# Patient Record
Sex: MALE | Employment: OTHER | URBAN - METROPOLITAN AREA
[De-identification: names, ages, dates, MRNs, and addresses within clinical notes are randomized per-mention and may not be internally consistent; named-entity substitution may affect disease eponyms.]

---

## 2017-01-04 ENCOUNTER — TELEPHONE (OUTPATIENT)
Dept: FAMILY MEDICINE | Facility: CLINIC | Age: 37
End: 2017-01-04

## 2017-01-04 NOTE — TELEPHONE ENCOUNTER
----- Message from Lory Ivy sent at 1/4/2017  1:53 PM CST -----  Rx Valsartan 40 mg is not covered but Rx Losartatin is covered.  Please call [facility] at [phone].

## 2017-01-12 ENCOUNTER — PATIENT MESSAGE (OUTPATIENT)
Dept: FAMILY MEDICINE | Facility: CLINIC | Age: 37
End: 2017-01-12

## 2017-01-12 NOTE — TELEPHONE ENCOUNTER
See jeff albertsg.   Called [facility] pharmacist and she reported Valsartan is not covered, alternative is Losartan or PA needed.     Do not have any information on PA, requested refax of PA request.

## 2017-01-17 NOTE — TELEPHONE ENCOUNTER
Requested again, to initiate PA for Valsartan, 40 mg been approved for 45 tab for 30 day supply for 12 months starting 1/6/17 to 1/8/2018.  Pa id# [id number].   Spoke with Mariaelena with [employer].  She stated the med has to be filled 30 day supply at a time to be approved.  [facility] as [facility] supply which is #45.  Pt informed script is approved and being filled at pharmacy.

## 2017-03-03 ENCOUNTER — DOCUMENTATION ONLY (OUTPATIENT)
Dept: ADMINISTRATIVE | Facility: HOSPITAL | Age: 37
End: 2017-03-03

## 2017-03-03 NOTE — PROGRESS NOTES
PRE-VISIT CHART REVIEW    Appointment Scheduled on 3/17/17    Department stratifications & guidelines reviewed:yes    Target Chronic Diagnosis: HTN, obesity    Chronic Diagnosis Intervention Due: no    Goals Updated:No    There are no preventive care reminders to display for this patient.    Advanced Directives:   65 years of age or older?  No  Directive on file?  N\A                                      Pre-visit patient communication:  In Person    Studies or screenings scheduled pre-visit: no    Educate patient on HTN (135/80), obesity (28.42)

## 2017-03-17 ENCOUNTER — OFFICE VISIT (OUTPATIENT)
Dept: FAMILY MEDICINE | Facility: CLINIC | Age: 37
End: 2017-03-17
Payer: MEDICAID

## 2017-03-17 VITALS
WEIGHT: 176.81 LBS | DIASTOLIC BLOOD PRESSURE: 78 MMHG | RESPIRATION RATE: 12 BRPM | HEART RATE: 88 BPM | SYSTOLIC BLOOD PRESSURE: 114 MMHG | BODY MASS INDEX: 27.75 KG/M2 | TEMPERATURE: 98 F | HEIGHT: 67 IN

## 2017-03-17 DIAGNOSIS — M79.646 PAIN OF FINGER, UNSPECIFIED LATERALITY: ICD-10-CM

## 2017-03-17 DIAGNOSIS — I10 HYPERTENSION, ESSENTIAL: Primary | ICD-10-CM

## 2017-03-17 DIAGNOSIS — M25.569 KNEE PAIN, UNSPECIFIED CHRONICITY, UNSPECIFIED LATERALITY: ICD-10-CM

## 2017-03-17 DIAGNOSIS — E79.0 HYPERURICEMIA: ICD-10-CM

## 2017-03-17 LAB
ALBUMIN SERPL BCP-MCNC: 4.3 G/DL
ALP SERPL-CCNC: 71 U/L
ALT SERPL W/O P-5'-P-CCNC: 34 U/L
ANION GAP SERPL CALC-SCNC: 8 MMOL/L
AST SERPL-CCNC: 27 U/L
BILIRUB SERPL-MCNC: 0.6 MG/DL
BUN SERPL-MCNC: 17 MG/DL
CALCIUM SERPL-MCNC: 9.6 MG/DL
CHLORIDE SERPL-SCNC: 106 MMOL/L
CO2 SERPL-SCNC: 28 MMOL/L
CREAT SERPL-MCNC: 1 MG/DL
EST. GFR  (AFRICAN AMERICAN): >60 ML/MIN/1.73 M^2
EST. GFR  (NON AFRICAN AMERICAN): >60 ML/MIN/1.73 M^2
GLUCOSE SERPL-MCNC: 91 MG/DL
POTASSIUM SERPL-SCNC: 4 MMOL/L
PROT SERPL-MCNC: 7.4 G/DL
SODIUM SERPL-SCNC: 142 MMOL/L
URATE SERPL-MCNC: 7.7 MG/DL

## 2017-03-17 PROCEDURE — 36415 COLL VENOUS BLD VENIPUNCTURE: CPT | Mod: S$GLB,,, | Performed by: FAMILY MEDICINE

## 2017-03-17 PROCEDURE — 99214 OFFICE O/P EST MOD 30 MIN: CPT | Mod: S$GLB,,, | Performed by: FAMILY MEDICINE

## 2017-03-17 PROCEDURE — 80053 COMPREHEN METABOLIC PANEL: CPT

## 2017-03-17 PROCEDURE — 84550 ASSAY OF BLOOD/URIC ACID: CPT

## 2017-03-17 NOTE — MR AVS SNAPSHOT
"    [facility]  [address]  [facility] [address]  Phone: [phone]  Fax: [phone]                  Cruz Hutchinson   3/17/2017 8:10 AM   Office Visit    Description:  Male : 1980   Provider:  Isaura Mcgarry MD   Department:  [facility]           Reason for Visit     Follow-up           Diagnoses this Visit        Comments    Hypertension, essential    -  Primary            To Do List           Goals (5 Years of Data)     None      [facility]     [facility] Care Line -  Assistance  Registered nurses in the [facility] Center provide clinical advisement, health education, appointment booking, and other advisory services.  Call for this free service at [phone].             Medications           Message regarding Medications     Verify the changes and/or additions to your medication regime listed below are the same as discussed with your clinician today.  If any of these changes or additions are incorrect, please notify your healthcare provider.             Verify that the below list of medications is an accurate representation of the medications you are currently taking.  If none reported, the list may be blank. If incorrect, please contact your healthcare provider. Carry this list with you in case of emergency.           Current Medications     ascorbic acid (VITAMIN C) 500 MG tablet Take 500 mg by mouth once daily.    cyanocobalamin (VITAMIN B-12) 100 MCG tablet Take 500 mcg by mouth once daily.    ODORLESS GARLIC ORAL Take 1,000 mg by mouth once daily.    valsartan (DIOVAN) 40 MG tablet Take 1 and 1/2 tablets by  mouth once daily           Clinical Reference Information           Your Vitals Were     BP Pulse Temp Resp Height Weight    114/78 88 98.1 °F (36.7 °C) (Oral) 12 5' 7" (1.702 m) 80.2 kg (176 lb 12.9 oz)    BMI                27.69 kg/m2          Blood Pressure          Most Recent Value    BP  114/78 "      Allergies as of 3/17/2017     No Known Allergies      Immunizations Administered on Date of Encounter - 3/17/2017     None      Orders Placed During Today's Visit      Normal Orders This Visit    Comprehensive metabolic panel     Uric acid       Language Assistance Services     ATTENTION: Language assistance services are available, free of charge. Please call [phone].      ATENCIÓN: Si paras fernando, tiene a cee disposición servicios gratuitos de asistencia lingüística. Llame al [phone].     CHÚ Ý: N?u b?n nói Ti?ng Vi?t, có các d?ch v? h? tr? ngôn ng? mi?n phí dành cho b?n. G?i s? [phone].         [facility] complies with applicable Federal civil rights laws and does not discriminate on the basis of race, color, national origin, age, disability, or sex.

## 2017-03-18 ENCOUNTER — PATIENT MESSAGE (OUTPATIENT)
Dept: FAMILY MEDICINE | Facility: CLINIC | Age: 37
End: 2017-03-18

## 2017-03-19 NOTE — PROGRESS NOTES
Subjective:       Patient ID: Cruz Hutchinson is a 36 y.o. male.    Chief Complaint: Follow-up (HTN)    HPI   The patient is a 36-year-old who is here today for follow-up.  Today we discussed the followin)  hypertension.  He is taking his Diovan regularly.  He has not felt as if his blood pressure has been high or low  2)  elevated LFTs.  He has significantly cut back on his alcohol intake.  He now drinks no more than one beer a week although he will drink more beer on the weekend.  He typically drinks 1 or 2 glasses of wine a night.  3)  right knee pain.  He has been having right knee pain for quite some time.  He especially notices his right knee pain when he is squatting.  He describes his knee as feeling as if he is having some stretching in the outer aspect of his right knee.  Sometimes the knee will pop or feel like it will give out.  He denies any specific injury or trauma to his knees although he has been hard on them over his life  4)  pain in the left middle finger.  Sometimes will have pain in his left middle finger if he extremely flexes his finger at the knuckle.  This is not a significant issue for him but he would like for me to be aware of it  5)  hyperuricemia.  We did discuss his last uric acid level.  He has not had any clear-cut episodes of gout himself but does worry about developing this given his strong family history of this condition.  He is not interested in a medication to lower his uric acid level      Review of Systems   Constitutional: Negative for appetite change, chills, diaphoresis, fatigue, fever and unexpected weight change.   HENT: Negative for congestion, ear pain, postnasal drip, rhinorrhea, sinus pressure, sneezing, sore throat and trouble swallowing.    Eyes: Negative for pain, discharge and visual disturbance.   Respiratory: Negative for cough, chest tightness, shortness of breath and wheezing.    Cardiovascular: Negative for chest pain, palpitations and leg swelling.    Gastrointestinal: Negative for abdominal distention, abdominal pain, blood in stool, constipation, diarrhea, nausea and vomiting.   Skin: Negative for rash.       Objective:      Physical Exam   Constitutional: He is oriented to person, place, and time. He appears well-developed and well-nourished. No distress.   HENT:   Head: Normocephalic and atraumatic.   Right Ear: Hearing, tympanic membrane, external ear and ear canal normal.   Left Ear: Hearing, tympanic membrane, external ear and ear canal normal.   Nose: Nose normal.   Mouth/Throat: Oropharynx is clear and moist and mucous membranes are normal. No oral lesions. No oropharyngeal exudate, posterior oropharyngeal edema or posterior oropharyngeal erythema.   Eyes: Conjunctivae, EOM and lids are normal. Pupils are equal, round, and reactive to light. No scleral icterus.   Neck: Normal range of motion. Neck supple. Carotid bruit is not present. No thyroid mass and no thyromegaly present.   Cardiovascular: Normal rate, regular rhythm and normal heart sounds.   No extrasystoles are present. PMI is not displaced.  Exam reveals no gallop.    No murmur heard.  Pulmonary/Chest: Effort normal and breath sounds normal. No accessory muscle usage. No respiratory distress.   Clear to auscultation bilaterally.   Abdominal: Soft. Normal appearance and bowel sounds are normal. He exhibits no abdominal bruit. There is no hepatosplenomegaly. There is no tenderness. There is no rebound.   Musculoskeletal:   Right knee with no effusion and normal range of motion.  He has some mild tenderness over the lateral joint line.  He has no ligamentous instability  Left middle finger is neurovascularly intact.  No bony tenderness is noted.  When the finger is extremely extended, he has tenderness extending from the MCP into the first phalanx   Lymphadenopathy:        Head (right side): No submental and no submandibular adenopathy present.        Head (left side): No submental and no  "submandibular adenopathy present.        Right cervical: No superficial cervical, no deep cervical and no posterior cervical adenopathy present.       Left cervical: No superficial cervical, no deep cervical and no posterior cervical adenopathy present.        Right: No supraclavicular adenopathy present.        Left: No supraclavicular adenopathy present.   Neurological: He is alert and oriented to person, place, and time.   Skin: Skin is warm, dry and intact.   Psychiatric: He has a normal mood and affect.     Blood pressure 114/78, pulse 88, temperature 98.1 °F (36.7 °C), temperature source Oral, resp. rate 12, height 5' 7" (1.702 m), weight 80.2 kg (176 lb 12.9 oz).Body mass index is 27.69 kg/(m^2).      A/P:  1)  hypertension.  Well controlled.  Continue with Diovan.  If his blood pressures are consistently higher than 139/89, he will let me know.  I will otherwise see him back in 6-12 months  2)  elevated LFTs.  Status unknown.  We will check LFTs today.  I do suspect his moderated alcohol intake will improve LFT test results  3)  right lateral knee pain.  Chronic but new to me.  If this becomes more problematic, we will refer him to the orthopedist  4)  left middle finger pain likely due to tendinosis.  Chronic but new to me.  If this becomes more problematic, we'll refer him to the orthopedist  5)  hyperuricemia.  Status unknown.  We will check a uric acid level today.  It is possible that this has improved with his decreased alcohol intake.  We did discuss other dietary interventions to lower his uric acid level.  If he develops any gouty symptoms, he will let me know  "

## 2017-03-24 RX ORDER — VALSARTAN 40 MG/1
TABLET ORAL
Qty: 135 TABLET | Refills: 2 | Status: SHIPPED | OUTPATIENT
Start: 2017-03-24 | End: 2018-02-02 | Stop reason: SDUPTHER

## 2017-04-21 RX ORDER — VALSARTAN 40 MG/1
TABLET ORAL
Qty: 135 TABLET | Refills: 0 | Status: SHIPPED | OUTPATIENT
Start: 2017-04-21 | End: 2018-02-02 | Stop reason: SDUPTHER

## 2017-07-22 RX ORDER — VALSARTAN 40 MG/1
TABLET ORAL
Qty: 135 TABLET | Refills: 1 | Status: SHIPPED | OUTPATIENT
Start: 2017-07-22 | End: 2018-01-25 | Stop reason: SDUPTHER

## 2018-01-25 ENCOUNTER — TELEPHONE (OUTPATIENT)
Dept: FAMILY MEDICINE | Facility: CLINIC | Age: 38
End: 2018-01-25

## 2018-01-25 RX ORDER — VALSARTAN 40 MG/1
60 TABLET ORAL DAILY
Qty: 135 TABLET | Refills: 0 | Status: SHIPPED | OUTPATIENT
Start: 2018-01-25 | End: 2018-03-16

## 2018-01-25 NOTE — TELEPHONE ENCOUNTER
Received a letter in the mail that I'm due for my annual appointment. The system will not let me schedule it online.

## 2018-01-27 ENCOUNTER — OFFICE VISIT (OUTPATIENT)
Dept: URGENT CARE | Facility: CLINIC | Age: 38
End: 2018-01-27

## 2018-01-27 VITALS
WEIGHT: 175 LBS | DIASTOLIC BLOOD PRESSURE: 82 MMHG | TEMPERATURE: 100 F | BODY MASS INDEX: 27.47 KG/M2 | SYSTOLIC BLOOD PRESSURE: 137 MMHG | RESPIRATION RATE: 16 BRPM | HEART RATE: 122 BPM | HEIGHT: 67 IN | OXYGEN SATURATION: 99 %

## 2018-01-27 DIAGNOSIS — J11.1 INFLUENZA: Primary | ICD-10-CM

## 2018-01-27 DIAGNOSIS — R68.89 FLU-LIKE SYMPTOMS: ICD-10-CM

## 2018-01-27 LAB
CTP QC/QA: YES
FLUAV AG NPH QL: NEGATIVE
FLUBV AG NPH QL: NEGATIVE

## 2018-01-27 PROCEDURE — 87804 INFLUENZA ASSAY W/OPTIC: CPT | Mod: 59,QW,S$GLB, | Performed by: PHYSICIAN ASSISTANT

## 2018-01-27 PROCEDURE — 3008F BODY MASS INDEX DOCD: CPT | Mod: S$GLB,,, | Performed by: PHYSICIAN ASSISTANT

## 2018-01-27 PROCEDURE — 99203 OFFICE O/P NEW LOW 30 MIN: CPT | Mod: S$GLB,,, | Performed by: PHYSICIAN ASSISTANT

## 2018-01-27 RX ORDER — OSELTAMIVIR PHOSPHATE 75 MG/1
75 CAPSULE ORAL 2 TIMES DAILY
Qty: 10 CAPSULE | Refills: 0 | Status: SHIPPED | OUTPATIENT
Start: 2018-01-27 | End: 2018-02-02 | Stop reason: ALTCHOICE

## 2018-01-27 RX ORDER — PROMETHAZINE HYDROCHLORIDE AND CODEINE PHOSPHATE 6.25; 1 MG/5ML; MG/5ML
5 SOLUTION ORAL EVERY 6 HOURS PRN
Qty: 118 ML | Refills: 0 | Status: SHIPPED | OUTPATIENT
Start: 2018-01-27 | End: 2018-02-02

## 2018-01-27 RX ORDER — BENZONATATE 200 MG/1
200 CAPSULE ORAL 3 TIMES DAILY PRN
Qty: 30 CAPSULE | Refills: 0 | Status: SHIPPED | OUTPATIENT
Start: 2018-01-27 | End: 2018-02-02

## 2018-01-27 NOTE — PATIENT INSTRUCTIONS
- Please return here or go to the Emergency Department for any concerns or worsening of condition.   - If you were prescribed a narcotic medication, do not drive or operate heavy equipment or machinery while taking these medications.  - Please follow up with your primary care provider (PCP) or discussed specialist(s) as needed.             Influenza (Adult)    Influenza is also called the flu. It is a viral illness that affects the air passages of your lungs. It is different from the common cold. The flu can easily be passed from one to person to another. It may be spread through the air by coughing and sneezing. Or it can be spread by touching the sick person and then touching your own eyes, nose, or mouth.  The flu starts 1 to 3 days after you are exposed to the flu virus. It may last for 1 to 2 weeks but many people feel tired or fatigued for many weeks afterward. You usually dont need to take antibiotics unless you have a complication. This might be an ear or sinus infection or pneumonia.  Symptoms of the flu may be mild or severe. They can include extreme tiredness (wanting to stay in bed all day), chills, fevers, muscle aches, soreness with eye movement, headache, and a dry, hacking cough.  Home care  Follow these guidelines when caring for yourself at home:  · Avoid being around cigarette smoke, whether yours or other peoples.  · Acetaminophen or ibuprofen will help ease your fever, muscle aches, and headache. Dont give aspirin to anyone younger than 18 who has the flu. Aspirin can harm the liver.  · Nausea and loss of appetite are common with the flu. Eat light meals. Drink 6 to 8 glasses of liquids every day. Good choices are water, sport drinks, soft drinks without caffeine, juices, tea, and soup. Extra fluids will also help loosen secretions in your nose and lungs.  · Over-the-counter cold medicines will not make the flu go away faster. But the medicines may help with coughing, sore throat, and  congestion in your nose and sinuses. Dont use a decongestant if you have high blood pressure.  · Stay home until your fever has been gone for at least 24 hours without using medicine to reduce fever.  Follow-up care  Follow up with your healthcare provider, or as advised, if you are not getting better over the next week.  If you are age 65 or older, talk with your provider about getting a pneumococcal vaccine every 5 years. You should also get this vaccine if you have chronic asthma or COPD. All adults should get a flu vaccine every fall. Ask your provider about this.  When to seek medical advice  Call your healthcare provider right away if any of these occur:  · Cough with lots of colored mucus (sputum) or blood in your mucus  · Chest pain, shortness of breath, wheezing, or trouble breathing  · Severe headache, or face, neck, or ear pain  · New rash with fever  · Fever of 100.4°F (38°C) or higher, or as directed by your healthcare provider  · Confusion, behavior change, or seizure  · Severe weakness or dizziness  · You get a new fever or cough after getting better for a few days  Date Last Reviewed: 1/1/2017  © 4772-6165 [employer]. [address] [place] [address], [place] [address]. All rights reserved. This information is not intended as a substitute for professional medical care. Always follow your healthcare professional's instructions.

## 2018-01-27 NOTE — PROGRESS NOTES
"Subjective:       Patient ID: Cruz Hutchinson is a 37 y.o. male.    Vitals:  height is 5' 7" (1.702 m) and weight is 79.4 kg (175 lb). His oral temperature is 100.3 °F (37.9 °C). His blood pressure is 137/82 and his pulse is 122 (abnormal). His respiration is 16 and oxygen saturation is 99%.     Chief Complaint: Influenza    Influenza   This is a new problem. The current episode started today. The problem has been unchanged. Associated symptoms include chills, fatigue, a fever, headaches, myalgias and weakness. Pertinent negatives include no abdominal pain, chest pain, congestion, coughing, nausea, neck pain, numbness, rash, sore throat or vomiting. He has tried nothing for the symptoms. The treatment provided no relief.     Review of Systems   Constitution: Positive for chills, fatigue, fever, weakness and malaise/fatigue.   HENT: Negative for congestion, ear pain, hoarse voice and sore throat.    Eyes: Negative for blurred vision, discharge, pain, redness and visual disturbance.   Cardiovascular: Negative for chest pain, dyspnea on exertion, leg swelling, near-syncope and syncope.   Respiratory: Negative for cough, shortness of breath, sputum production and wheezing.    Hematologic/Lymphatic: Negative for adenopathy.   Skin: Negative for itching and rash.   Musculoskeletal: Positive for myalgias. Negative for back pain, neck pain and stiffness.   Gastrointestinal: Negative for abdominal pain, diarrhea, nausea and vomiting.   Neurological: Positive for headaches. Negative for dizziness, light-headedness and numbness.   Psychiatric/Behavioral: Negative for altered mental status.   Allergic/Immunologic: Negative for hives.   All other systems reviewed and are negative.      Objective:      Physical Exam   Constitutional: He is oriented to person, place, and time. He appears well-developed and well-nourished.  Non-toxic appearance. He has a sickly appearance. He appears ill. No distress.   HENT:   Head: Normocephalic " and atraumatic.   Right Ear: Tympanic membrane, external ear and ear canal normal.   Left Ear: Tympanic membrane, external ear and ear canal normal.   Nose: No mucosal edema. No epistaxis. Right sinus exhibits no maxillary sinus tenderness and no frontal sinus tenderness. Left sinus exhibits no maxillary sinus tenderness and no frontal sinus tenderness.   Mouth/Throat: Uvula is midline and mucous membranes are normal. No uvula swelling. Posterior oropharyngeal erythema present. No oropharyngeal exudate or posterior oropharyngeal edema.   Bilateral clear nasal congestion and erythema    Eyes: Pupils are equal, round, and reactive to light.   Neck: Normal range of motion. Neck supple.   Cardiovascular: Regular rhythm and normal heart sounds.  Tachycardia present.  Exam reveals no gallop and no friction rub.    No murmur heard.  Pulmonary/Chest: Effort normal and breath sounds normal. No respiratory distress. He has no decreased breath sounds. He has no wheezes. He has no rhonchi. He has no rales.   Musculoskeletal: Normal range of motion.   Lymphadenopathy:        Head (right side): No submental, no submandibular, no tonsillar, no preauricular, no posterior auricular and no occipital adenopathy present.        Head (left side): No submental, no submandibular, no tonsillar, no preauricular, no posterior auricular and no occipital adenopathy present.     He has no cervical adenopathy.        Right cervical: No posterior cervical adenopathy present.       Left cervical: No posterior cervical adenopathy present.        Right: No supraclavicular adenopathy present.        Left: No supraclavicular adenopathy present.   Neurological: He is alert and oriented to person, place, and time. He is not disoriented. Coordination and gait normal.   Skin: No abrasion, no ecchymosis, no laceration and no rash noted. No erythema.   Psychiatric: He has a normal mood and affect. His behavior is normal.   Nursing note and vitals  reviewed.      Assessment:       1. Influenza    2. Flu-like symptoms        Plan:         Influenza  -     oseltamivir (TAMIFLU) 75 MG capsule; Take 1 capsule (75 mg total) by mouth 2 (two) times daily.  Dispense: 10 capsule; Refill: 0  -     promethazine-codeine 6.25-10 mg/5 ml (PHENERGAN WITH CODEINE) 6.25-10 mg/5 mL syrup; Take 5 mLs by mouth every 6 (six) hours as needed for Cough.  Dispense: 118 mL; Refill: 0  -     benzonatate (TESSALON) 200 MG capsule; Take 1 capsule (200 mg total) by mouth 3 (three) times daily as needed for Cough.  Dispense: 30 capsule; Refill: 0    Flu-like symptoms  -     POCT Influenza A/B

## 2018-01-29 NOTE — TELEPHONE ENCOUNTER
Pt called, was seen in urgent care over the weekend, pt reports he is feeling better but is having some uri symptoms and is causing him some issues, wonders if he can have something called in to help relieve these issues, allergies reviewed, pt is currently on tamiflu for possible flu, swab was negative.    Also pt is scheduled for 3-1-2018 for follow up/ annual

## 2018-01-29 NOTE — TELEPHONE ENCOUNTER
----- Message from Lory Shoemakeran sent at 1/29/2018 10:26 AM CST -----  Patient states that he went to urgent care on Saturday for flu symptoms but is no better.  Please call patient at [phone].

## 2018-01-30 NOTE — TELEPHONE ENCOUNTER
Pt reports he is not coughing  Anymore. Reports he never really took the cough meds,. reports tightness/dryness in chest like there is something there that's not coming up, lots of mucus from sinuses noted post shower last night and this morning.

## 2018-01-31 NOTE — TELEPHONE ENCOUNTER
I would suggest mucinex DM to thin secretions    This should run its coarse in the next 5 - 7 days    Let us know if you develop new or worsening sx

## 2018-02-01 ENCOUNTER — PATIENT MESSAGE (OUTPATIENT)
Dept: FAMILY MEDICINE | Facility: CLINIC | Age: 38
End: 2018-02-01

## 2018-02-01 RX ORDER — ALBUTEROL SULFATE 0.83 MG/ML
2.5 SOLUTION RESPIRATORY (INHALATION) EVERY 6 HOURS PRN
Qty: 1 BOX | Refills: 1 | Status: SHIPPED | OUTPATIENT
Start: 2018-02-01 | End: 2018-07-16 | Stop reason: ALTCHOICE

## 2018-02-01 NOTE — TELEPHONE ENCOUNTER
Left message stating Dr Mcgarry recommendations and to call us with new or worsening symptoms.--lp

## 2018-02-02 ENCOUNTER — OFFICE VISIT (OUTPATIENT)
Dept: FAMILY MEDICINE | Facility: CLINIC | Age: 38
End: 2018-02-02
Payer: MEDICAID

## 2018-02-02 VITALS
OXYGEN SATURATION: 100 % | HEIGHT: 67 IN | WEIGHT: 175 LBS | HEART RATE: 98 BPM | TEMPERATURE: 98 F | SYSTOLIC BLOOD PRESSURE: 110 MMHG | DIASTOLIC BLOOD PRESSURE: 70 MMHG | BODY MASS INDEX: 27.47 KG/M2

## 2018-02-02 DIAGNOSIS — J45.31 MILD PERSISTENT REACTIVE AIRWAY DISEASE WITH ACUTE EXACERBATION: Primary | ICD-10-CM

## 2018-02-02 PROCEDURE — 3008F BODY MASS INDEX DOCD: CPT | Mod: S$GLB,,, | Performed by: FAMILY MEDICINE

## 2018-02-02 PROCEDURE — 99213 OFFICE O/P EST LOW 20 MIN: CPT | Mod: S$GLB,,, | Performed by: FAMILY MEDICINE

## 2018-02-02 RX ORDER — PREDNISONE 20 MG/1
60 TABLET ORAL DAILY
Qty: 15 TABLET | Refills: 0 | Status: SHIPPED | OUTPATIENT
Start: 2018-02-02 | End: 2018-02-07

## 2018-02-02 NOTE — TELEPHONE ENCOUNTER
----- Message from Matilde Mata sent at 2/2/2018 10:02 AM CST -----  Contact: self  Call placed to pod.  Patient was seen this morning and was told to start a new med this morning. However, he has a question regarding the dosage. Please call back at [phone] (home)

## 2018-02-02 NOTE — TELEPHONE ENCOUNTER
Pt called and was double checking directions for use. Reviewed medication directions as written in chart pt verbalized understanding no further needs

## 2018-02-12 NOTE — PROGRESS NOTES
"Subjective:       Patient ID: Cruz Hutchinson is a 37 y.o. male.    Chief Complaint: Chest Congestion (Chest feels tight, For about one week)    HPI   The patient is a 37-year-old who is here today because he has been sick.  He has been sick for over a week.  This illness started last Friday, January 26, when he developed a little bit of sinus congestion that evening.  On Saturday, January 27, he felt worse and tells me he  "felt like garbage".  That day, his symptoms included sinus congestion, postnasal drainage, sore throat, fevers up to 100, chills, generalized achiness and "feeling like crap".  That day, he went to the urgent care clinic for evaluation.  At the clinic, he had a flu swab which was negative but the doctor still felt he had influenza.  He was treated with a course of Tamiflu, Phenergan with codeine and Tessalon Perles.  On Sunday, January 28, he started to feel better especially with the achiness but his other symptoms though present were milder than they were the day before.  Monday, January 29, his symptoms continued to improve and he felt better but that day he developed some tightness in his chest.  Over the course of this week, his symptoms have all gradually improved but the tightness in his chest has become more prominent.  He did use his son's albuterol neb last night and this morning and that did seem to help loosen up his chest.  He denies any significant cough.  He has had no recent fevers.  He is scheduled to leave on a cruise next Friday and is anxious to get better so that he is not sick on his cruise      Review of Systems   Constitutional: Negative for appetite change, chills, diaphoresis, fatigue, fever and unexpected weight change.   HENT: Negative for congestion, ear pain, postnasal drip, rhinorrhea, sinus pressure, sneezing, sore throat and trouble swallowing.    Eyes: Negative for pain, discharge and visual disturbance.   Respiratory: Positive for chest tightness. Negative for " cough, shortness of breath and wheezing.    Cardiovascular: Negative for chest pain, palpitations and leg swelling.   Gastrointestinal: Negative for abdominal distention, abdominal pain, blood in stool, constipation, diarrhea, nausea and vomiting.   Skin: Negative for rash.       Objective:      Physical Exam   Constitutional: He is oriented to person, place, and time. He appears well-developed and well-nourished. No distress.   HENT:   Head: Normocephalic and atraumatic.   Right Ear: Hearing, tympanic membrane, external ear and ear canal normal.   Left Ear: Hearing, tympanic membrane, external ear and ear canal normal.   Nose: Nose normal.   Mouth/Throat: Oropharynx is clear and moist and mucous membranes are normal. No oral lesions. No oropharyngeal exudate, posterior oropharyngeal edema or posterior oropharyngeal erythema.   Eyes: Conjunctivae, EOM and lids are normal. Pupils are equal, round, and reactive to light. No scleral icterus.   Neck: Normal range of motion. Neck supple. Carotid bruit is not present. No thyroid mass and no thyromegaly present.   Cardiovascular: Normal rate, regular rhythm and normal heart sounds.   No extrasystoles are present. PMI is not displaced.  Exam reveals no gallop.    No murmur heard.  Pulmonary/Chest: Effort normal and breath sounds normal. No accessory muscle usage. No respiratory distress.   Clear to auscultation bilaterally.   Abdominal: Soft. Normal appearance and bowel sounds are normal. He exhibits no abdominal bruit. There is no hepatosplenomegaly. There is no tenderness. There is no rebound.   Lymphadenopathy:        Head (right side): No submental and no submandibular adenopathy present.        Head (left side): No submental and no submandibular adenopathy present.        Right cervical: No superficial cervical, no deep cervical and no posterior cervical adenopathy present.       Left cervical: No superficial cervical, no deep cervical and no posterior cervical  "adenopathy present.        Right: No supraclavicular adenopathy present.        Left: No supraclavicular adenopathy present.   Neurological: He is alert and oriented to person, place, and time.   Skin: Skin is warm, dry and intact.   Psychiatric: He has a normal mood and affect.     Blood pressure 110/70, pulse 98, temperature 98.2 °F (36.8 °C), temperature source Oral, height 5' 7" (1.702 m), weight 79.4 kg (175 lb), SpO2 100 %.Body mass index is 27.41 kg/m².          A/P:  1)  RAD.  Acute.  We will continue with his albuterol nebs at home and he was given his own prescription for the albuterol.  We will start prednisone.  If he is not doing better by early next week or if he develops any new or worsening symptoms, he will let me know  "

## 2018-02-21 ENCOUNTER — PATIENT MESSAGE (OUTPATIENT)
Dept: FAMILY MEDICINE | Facility: CLINIC | Age: 38
End: 2018-02-21

## 2018-02-21 ENCOUNTER — OFFICE VISIT (OUTPATIENT)
Dept: FAMILY MEDICINE | Facility: CLINIC | Age: 38
End: 2018-02-21
Payer: MEDICAID

## 2018-02-21 VITALS
WEIGHT: 175.81 LBS | OXYGEN SATURATION: 99 % | HEIGHT: 67 IN | DIASTOLIC BLOOD PRESSURE: 88 MMHG | TEMPERATURE: 98 F | RESPIRATION RATE: 20 BRPM | SYSTOLIC BLOOD PRESSURE: 130 MMHG | BODY MASS INDEX: 27.6 KG/M2 | HEART RATE: 97 BPM

## 2018-02-21 DIAGNOSIS — R05.9 COUGH: Primary | ICD-10-CM

## 2018-02-21 DIAGNOSIS — I10 HYPERTENSION, ESSENTIAL: ICD-10-CM

## 2018-02-21 DIAGNOSIS — J01.10 ACUTE NON-RECURRENT FRONTAL SINUSITIS: ICD-10-CM

## 2018-02-21 DIAGNOSIS — J45.31 MILD PERSISTENT REACTIVE AIRWAY DISEASE WITH ACUTE EXACERBATION: ICD-10-CM

## 2018-02-21 PROCEDURE — 99214 OFFICE O/P EST MOD 30 MIN: CPT | Mod: PBBFAC,PO | Performed by: NURSE PRACTITIONER

## 2018-02-21 PROCEDURE — 99999 PR PBB SHADOW E&M-EST. PATIENT-LVL IV: CPT | Mod: PBBFAC,,, | Performed by: NURSE PRACTITIONER

## 2018-02-21 PROCEDURE — 99213 OFFICE O/P EST LOW 20 MIN: CPT | Mod: S$PBB,,, | Performed by: NURSE PRACTITIONER

## 2018-02-21 PROCEDURE — 3008F BODY MASS INDEX DOCD: CPT | Mod: ,,, | Performed by: NURSE PRACTITIONER

## 2018-02-21 RX ORDER — AMOXICILLIN AND CLAVULANATE POTASSIUM 875; 125 MG/1; MG/1
1 TABLET, FILM COATED ORAL 2 TIMES DAILY
Qty: 14 TABLET | Refills: 0 | Status: SHIPPED | OUTPATIENT
Start: 2018-02-21 | End: 2018-03-01

## 2018-02-21 NOTE — PROGRESS NOTES
Subjective:       Patient ID: Cruz Hutchinson is a 37 y.o. male.    Chief Complaint: URI    Here today with 4 days with productive cough and congestion     Saw Dr Mcgarry on 2-2-18 for RAD- treated with prednisone and got better -went on cruise and returned with cough and productive green mucus   Started back on nebs with cough recently over the last few days       Cough   This is a new problem. The current episode started in the past 7 days. The problem has been gradually worsening. The cough is productive of sputum and productive of purulent sputum. Associated symptoms include nasal congestion, postnasal drip, rhinorrhea, a sore throat and wheezing. Pertinent negatives include no chest pain, chills, ear congestion, ear pain, fever, headaches, heartburn, hemoptysis, myalgias, rash, shortness of breath, sweats or weight loss. Nothing aggravates the symptoms. He has tried a beta-agonist inhaler and rest for the symptoms. The treatment provided no relief. His past medical history is significant for asthma. There is no history of bronchiectasis, bronchitis, COPD, emphysema, environmental allergies or pneumonia.     Vitals:    02/21/18 1058   BP: 130/88   Pulse: 97   Resp: 20   Temp: 98.1 °F (36.7 °C)     Review of Systems   Constitutional: Negative.  Negative for chills, fatigue, fever and weight loss.   HENT: Positive for congestion, postnasal drip, rhinorrhea, sinus pressure and sore throat. Negative for ear pain.    Eyes: Negative.    Respiratory: Positive for wheezing. Negative for cough, hemoptysis and shortness of breath.    Cardiovascular: Negative.  Negative for chest pain.   Gastrointestinal: Negative.  Negative for abdominal pain, diarrhea, heartburn and nausea.   Endocrine: Negative.    Genitourinary: Negative.  Negative for dysuria and hematuria.   Musculoskeletal: Negative.  Negative for myalgias.   Skin: Negative.  Negative for color change and rash.   Allergic/Immunologic: Negative.  Negative for  environmental allergies.   Neurological: Negative.  Negative for numbness and headaches.   Hematological: Negative.    Psychiatric/Behavioral: Negative.        Past Medical History:   Diagnosis Date    Bilateral chronic knee pain     Elevated LFTs     Hyperlipidemia     Hypertension     PVC's (premature ventricular contractions)     noted on Holter 3/15     Objective:      Physical Exam   Constitutional: He is oriented to person, place, and time. He appears well-developed and well-nourished.   HENT:   Head: Normocephalic and atraumatic.   Right Ear: Hearing, tympanic membrane and ear canal normal.   Left Ear: Hearing, tympanic membrane and ear canal normal.   Nose: Mucosal edema and rhinorrhea present. Right sinus exhibits maxillary sinus tenderness. Right sinus exhibits no frontal sinus tenderness. Left sinus exhibits maxillary sinus tenderness. Left sinus exhibits no frontal sinus tenderness.   Mouth/Throat: Uvula is midline. Posterior oropharyngeal erythema present.   Eyes: Conjunctivae and EOM are normal. Pupils are equal, round, and reactive to light.   Neck: Normal range of motion. Neck supple.   Cardiovascular: Normal rate, regular rhythm, normal heart sounds and intact distal pulses.    No murmur heard.  Pulmonary/Chest: Effort normal and breath sounds normal.   Abdominal: Soft. Bowel sounds are normal.   Musculoskeletal: Normal range of motion.   Neurological: He is alert and oriented to person, place, and time.   Skin: Skin is warm and dry.   Psychiatric: He has a normal mood and affect. His behavior is normal.   Nursing note and vitals reviewed.      Assessment:       1. Cough    2. Acute non-recurrent frontal sinusitis    3. Mild persistent reactive airway disease with acute exacerbation    4. Hypertension, essential        Plan:       Cough  Acute non-recurrent frontal sinusitis  -     amoxicillin-clavulanate 875-125mg (AUGMENTIN) 875-125 mg per tablet; Take 1 tablet by mouth 2 (two) times daily.   Dispense: 14 tablet; Refill: 0    Mild persistent reactive airway disease with acute exacerbation  Better with nebs and steroids- taper off the nebs   Discussed adding flonase and zyrtec for allergy symptoms     Hypertension, essential  Stable on meds         Fu if not better

## 2018-03-01 ENCOUNTER — OFFICE VISIT (OUTPATIENT)
Dept: FAMILY MEDICINE | Facility: CLINIC | Age: 38
End: 2018-03-01
Payer: MEDICAID

## 2018-03-01 VITALS
HEIGHT: 67 IN | DIASTOLIC BLOOD PRESSURE: 92 MMHG | WEIGHT: 177.25 LBS | HEART RATE: 78 BPM | SYSTOLIC BLOOD PRESSURE: 138 MMHG | TEMPERATURE: 98 F | RESPIRATION RATE: 16 BRPM | BODY MASS INDEX: 27.82 KG/M2

## 2018-03-01 DIAGNOSIS — Z00.00 ANNUAL PHYSICAL EXAM: Primary | ICD-10-CM

## 2018-03-01 PROCEDURE — 99395 PREV VISIT EST AGE 18-39: CPT | Mod: S$GLB,,, | Performed by: FAMILY MEDICINE

## 2018-03-02 NOTE — PROGRESS NOTES
Subjective:       Patient ID: Cruz Hutchinson is a 37 y.o. male.    Chief Complaint: Annual Exam    HPI   The patient is a 37-year-old who is here today for his annual exam.  Generally, things are going well for him.  He did have a particularly hectic morning and believes that's why his blood pressure is high today.  He is due for fasting labs and would be willing to have that done.  He does not do any regular aerobic exercise although he does stay physically active with work.  He is consuming 4-8 beers a night and does not believe this is a problem and could stop if he wanted to    He has had several illnesses this year but has finally recovered from these illnesses    We did discuss his elevated blood pressure today.  He believes his blood pressure is elevated today because of the hectic morning he's had.  His readings at home are usually in the 130s over 70s to 80s.  He is currently taking Diovan 60 mg once a day and believes this is working well for him.  We did discuss increasing the strength of this medicine to 80 mg especially with some of the new recommendations regarding hypertension control but he is hesitant to make that change      Review of Systems   Constitutional: Negative for appetite change, chills, diaphoresis, fatigue, fever and unexpected weight change.   HENT: Negative for congestion, dental problem, ear pain, postnasal drip, rhinorrhea, sinus pressure, sneezing, sore throat and trouble swallowing.    Eyes: Negative for photophobia, pain, discharge and visual disturbance.   Respiratory: Negative for cough, chest tightness, shortness of breath and wheezing.    Cardiovascular: Negative for chest pain, palpitations and leg swelling.   Gastrointestinal: Negative for abdominal distention, abdominal pain, blood in stool, constipation, diarrhea, nausea and vomiting.   Endocrine: Negative for polydipsia and polyuria.   Genitourinary: Negative for discharge, dysuria, flank pain, frequency, genital  sores, hematuria and testicular pain.   Musculoskeletal: Negative for arthralgias, joint swelling and myalgias.   Skin: Negative for rash.   Neurological: Negative for dizziness, syncope, weakness, light-headedness and headaches.   Hematological: Negative for adenopathy. Does not bruise/bleed easily.   Psychiatric/Behavioral: Negative for dysphoric mood, self-injury, sleep disturbance and suicidal ideas. The patient is not nervous/anxious.        Objective:      Physical Exam   Constitutional: He is oriented to person, place, and time. He appears well-developed and well-nourished. No distress.   HENT:   Head: Normocephalic and atraumatic.   Right Ear: Hearing, tympanic membrane, external ear and ear canal normal.   Left Ear: Hearing, tympanic membrane, external ear and ear canal normal.   Nose: Nose normal.   Mouth/Throat: Oropharynx is clear and moist and mucous membranes are normal. No oral lesions. No oropharyngeal exudate, posterior oropharyngeal edema or posterior oropharyngeal erythema.   Eyes: Conjunctivae, EOM and lids are normal. Pupils are equal, round, and reactive to light. No scleral icterus.   Neck: Normal range of motion. Neck supple. Carotid bruit is not present. No thyroid mass and no thyromegaly present.   Cardiovascular: Normal rate, regular rhythm and normal heart sounds.   No extrasystoles are present. PMI is not displaced.  Exam reveals no gallop.    No murmur heard.  Pulmonary/Chest: Effort normal and breath sounds normal. No accessory muscle usage. No respiratory distress.   Clear to auscultation bilaterally.   Abdominal: Soft. Normal appearance and bowel sounds are normal. He exhibits no abdominal bruit. There is no hepatosplenomegaly. There is no tenderness. There is no rebound.   Lymphadenopathy:        Head (right side): No submental and no submandibular adenopathy present.        Head (left side): No submental and no submandibular adenopathy present.        Right cervical: No superficial  "cervical, no deep cervical and no posterior cervical adenopathy present.       Left cervical: No superficial cervical, no deep cervical and no posterior cervical adenopathy present.        Right: No supraclavicular adenopathy present.        Left: No supraclavicular adenopathy present.   Neurological: He is alert and oriented to person, place, and time. He has normal strength. No cranial nerve deficit or sensory deficit.   Skin: Skin is warm, dry and intact.   Psychiatric: He has a normal mood and affect. His speech is normal and behavior is normal. Thought content normal. Cognition and memory are normal.     Blood pressure (!) 138/92, pulse 78, temperature 98.1 °F (36.7 °C), temperature source Oral, resp. rate 16, height 5' 7" (1.702 m), weight 80.4 kg (177 lb 4 oz).Body mass index is 27.76 kg/m².        A/P:  1)  annual exam.  Health maintenance issues and anticipatory guidance issues were discussed.  I did recommend he cut back his beer to 2 beers a night especially given his prior elevated LFTs, hypertriglyceridemia and hyperuricemia.  We will check fasting labs soon.  I did recommend aerobic exercise  2)  hypertension.  Uncertain control.  He will come back in 2 weeks to have the nurses check his blood pressure.  He will check his blood pressure at home and bring in his blood pressure cuff to the nurse visits we can make sure it is accurate.  We will reconsider his hypertensive medications after the nurse visit in 2 weeks but for now he will continue with the Diovan 60 mg once a day  "

## 2018-03-08 ENCOUNTER — LAB VISIT (OUTPATIENT)
Dept: LAB | Facility: HOSPITAL | Age: 38
End: 2018-03-08
Attending: FAMILY MEDICINE
Payer: MEDICAID

## 2018-03-08 DIAGNOSIS — Z00.00 ANNUAL PHYSICAL EXAM: ICD-10-CM

## 2018-03-08 LAB
ALBUMIN SERPL BCP-MCNC: 4.6 G/DL
ALP SERPL-CCNC: 63 U/L
ALT SERPL W/O P-5'-P-CCNC: 41 U/L
ANION GAP SERPL CALC-SCNC: 12 MMOL/L
AST SERPL-CCNC: 29 U/L
BASOPHILS # BLD AUTO: 0.05 K/UL
BASOPHILS NFR BLD: 0.8 %
BILIRUB SERPL-MCNC: 1.1 MG/DL
BUN SERPL-MCNC: 15 MG/DL
CALCIUM SERPL-MCNC: 10.3 MG/DL
CHLORIDE SERPL-SCNC: 103 MMOL/L
CHOLEST SERPL-MCNC: 211 MG/DL
CHOLEST/HDLC SERPL: 3.1 {RATIO}
CO2 SERPL-SCNC: 27 MMOL/L
CREAT SERPL-MCNC: 1.1 MG/DL
DIFFERENTIAL METHOD: ABNORMAL
EOSINOPHIL # BLD AUTO: 0.1 K/UL
EOSINOPHIL NFR BLD: 2.2 %
ERYTHROCYTE [DISTWIDTH] IN BLOOD BY AUTOMATED COUNT: 11.7 %
EST. GFR  (AFRICAN AMERICAN): >60 ML/MIN/1.73 M^2
EST. GFR  (NON AFRICAN AMERICAN): >60 ML/MIN/1.73 M^2
ESTIMATED AVG GLUCOSE: 91 MG/DL
GLUCOSE SERPL-MCNC: 105 MG/DL
HBA1C MFR BLD HPLC: 4.8 %
HCT VFR BLD AUTO: 45.1 %
HDLC SERPL-MCNC: 69 MG/DL
HDLC SERPL: 32.7 %
HGB BLD-MCNC: 15.5 G/DL
IMM GRANULOCYTES # BLD AUTO: 0.02 K/UL
IMM GRANULOCYTES NFR BLD AUTO: 0.3 %
LDLC SERPL CALC-MCNC: 124.8 MG/DL
LYMPHOCYTES # BLD AUTO: 2 K/UL
LYMPHOCYTES NFR BLD: 33.6 %
MCH RBC QN AUTO: 32 PG
MCHC RBC AUTO-ENTMCNC: 34.4 G/DL
MCV RBC AUTO: 93 FL
MONOCYTES # BLD AUTO: 0.6 K/UL
MONOCYTES NFR BLD: 9.4 %
NEUTROPHILS # BLD AUTO: 3.2 K/UL
NEUTROPHILS NFR BLD: 53.7 %
NONHDLC SERPL-MCNC: 142 MG/DL
NRBC BLD-RTO: 0 /100 WBC
PLATELET # BLD AUTO: 247 K/UL
PMV BLD AUTO: 10.5 FL
POTASSIUM SERPL-SCNC: 4.4 MMOL/L
PROT SERPL-MCNC: 8 G/DL
RBC # BLD AUTO: 4.85 M/UL
SODIUM SERPL-SCNC: 142 MMOL/L
TRIGL SERPL-MCNC: 86 MG/DL
URATE SERPL-MCNC: 9.1 MG/DL
WBC # BLD AUTO: 5.95 K/UL

## 2018-03-08 PROCEDURE — 36415 COLL VENOUS BLD VENIPUNCTURE: CPT | Mod: PO

## 2018-03-08 PROCEDURE — 83036 HEMOGLOBIN GLYCOSYLATED A1C: CPT

## 2018-03-08 PROCEDURE — 85025 COMPLETE CBC W/AUTO DIFF WBC: CPT

## 2018-03-08 PROCEDURE — 80053 COMPREHEN METABOLIC PANEL: CPT

## 2018-03-08 PROCEDURE — 80061 LIPID PANEL: CPT

## 2018-03-08 PROCEDURE — 84550 ASSAY OF BLOOD/URIC ACID: CPT

## 2018-03-10 ENCOUNTER — PATIENT MESSAGE (OUTPATIENT)
Dept: FAMILY MEDICINE | Facility: CLINIC | Age: 38
End: 2018-03-10

## 2018-03-12 ENCOUNTER — PATIENT MESSAGE (OUTPATIENT)
Dept: FAMILY MEDICINE | Facility: CLINIC | Age: 38
End: 2018-03-12

## 2018-03-16 ENCOUNTER — OFFICE VISIT (OUTPATIENT)
Dept: FAMILY MEDICINE | Facility: CLINIC | Age: 38
End: 2018-03-16
Payer: MEDICAID

## 2018-03-16 VITALS
SYSTOLIC BLOOD PRESSURE: 140 MMHG | BODY MASS INDEX: 27.63 KG/M2 | DIASTOLIC BLOOD PRESSURE: 90 MMHG | HEIGHT: 67 IN | WEIGHT: 176.06 LBS | TEMPERATURE: 99 F | RESPIRATION RATE: 16 BRPM | HEART RATE: 76 BPM

## 2018-03-16 DIAGNOSIS — M25.512 CHRONIC LEFT SHOULDER PAIN: ICD-10-CM

## 2018-03-16 DIAGNOSIS — G89.29 CHRONIC LEFT SHOULDER PAIN: ICD-10-CM

## 2018-03-16 DIAGNOSIS — I10 HYPERTENSION, UNSPECIFIED TYPE: Primary | ICD-10-CM

## 2018-03-16 PROCEDURE — 99214 OFFICE O/P EST MOD 30 MIN: CPT | Mod: S$GLB,,, | Performed by: NURSE PRACTITIONER

## 2018-03-16 RX ORDER — VALSARTAN 80 MG/1
80 TABLET ORAL DAILY
Qty: 90 TABLET | Refills: 3 | Status: SHIPPED | OUTPATIENT
Start: 2018-03-16 | End: 2018-08-13

## 2018-03-16 NOTE — PROGRESS NOTES
"Subjective:       Patient ID: Cruz Hutchinson is a 37 y.o. male.    Chief Complaint: Follow-up     The patient is here for hypertension follow-up.  He is currently on Diovan 60 mg and has been for quite some time.  His blood pressure has been trending slightly high and he was told to follow-up regarding this.  He denies any vision changes or headaches.  No chest pain or palpitations.    He also has chronic shoulder pain that he would like to discuss.No pain in shoulder when still. Hurts in one specific area when moving to the left anterior shoulder.  He tells me that he was very hard on his body and he never slows down to have things checked out.  He denies any numbness or tingling to the left arm.  No weakness to the left arm.      Review of Systems   Constitutional: Negative for activity change and appetite change.   HENT: Negative for congestion, postnasal drip, rhinorrhea and sinus pressure.    Eyes: Negative for pain and redness.   Respiratory: Negative for choking and chest tightness.    Gastrointestinal: Negative for abdominal distention, abdominal pain, blood in stool, constipation, diarrhea, nausea and vomiting.   Endocrine: Negative for polydipsia and polyphagia.   Genitourinary: Negative for dysuria and hematuria.   Musculoskeletal: Positive for arthralgias. Negative for myalgias.   Skin: Negative for color change and rash.   Neurological: Negative for dizziness and headaches.   Psychiatric/Behavioral: Negative for agitation and behavioral problems.       Objective:       Vitals:    03/16/18 0848   BP: (!) 140/90   Pulse: 76   Resp: 16   Temp: 98.8 °F (37.1 °C)   TempSrc: Oral   Weight: 79.9 kg (176 lb 0.6 oz)   Height: 5' 7" (1.702 m)   PainSc: 0-No pain       Physical Exam   Constitutional: He is oriented to person, place, and time. He appears well-developed and well-nourished.   HENT:   Head: Normocephalic and atraumatic.   Right Ear: External ear normal.   Left Ear: External ear normal.   Nose: Nose " normal.   Mouth/Throat: Oropharynx is clear and moist.   Eyes: Conjunctivae are normal. Right eye exhibits no discharge. Left eye exhibits no discharge. No scleral icterus.   Neck: Normal range of motion. Neck supple. No tracheal deviation present.   Cardiovascular: Normal rate, regular rhythm and normal heart sounds.  Exam reveals no friction rub.    No murmur heard.  Pulmonary/Chest: Effort normal and breath sounds normal. No stridor. No respiratory distress. He has no wheezes. He has no rales. He exhibits no tenderness.   Musculoskeletal: Normal range of motion.   There is point tenderness over the left anterior shoulder.  There is full range of motion with pain at 30°.  Strength is normal.   Lymphadenopathy:     He has no cervical adenopathy.   Neurological: He is alert and oriented to person, place, and time.   Skin: Skin is warm and dry.   Psychiatric: He has a normal mood and affect.       Assessment:       1. Hypertension, unspecified type    2. Chronic left shoulder pain        Plan:       Cruz was seen today for follow-up.    Diagnoses and all orders for this visit:    Hypertension, unspecified type    Increase  -     valsartan (DIOVAN) 80 MG tablet; Take 1 tablet (80 mg total) by mouth once daily.  Nurse visit for BP check in 2 weeks    Chronic left shoulder pain  -     X-Ray Shoulder Trauma 3 view Left; Future

## 2018-03-23 ENCOUNTER — HOSPITAL ENCOUNTER (OUTPATIENT)
Dept: RADIOLOGY | Facility: HOSPITAL | Age: 38
Discharge: HOME | End: 2018-03-23
Attending: NURSE PRACTITIONER
Payer: MEDICAID

## 2018-03-23 DIAGNOSIS — M25.512 CHRONIC LEFT SHOULDER PAIN: ICD-10-CM

## 2018-03-23 DIAGNOSIS — G89.29 CHRONIC LEFT SHOULDER PAIN: ICD-10-CM

## 2018-03-23 PROCEDURE — 73030 X-RAY EXAM OF SHOULDER: CPT | Mod: TC,FY,PO,LT

## 2018-03-23 PROCEDURE — 73030 X-RAY EXAM OF SHOULDER: CPT | Mod: 26,LT,, | Performed by: RADIOLOGY

## 2018-03-26 ENCOUNTER — PATIENT MESSAGE (OUTPATIENT)
Dept: FAMILY MEDICINE | Facility: CLINIC | Age: 38
End: 2018-03-26

## 2018-03-26 DIAGNOSIS — M25.519 SHOULDER PAIN, UNSPECIFIED CHRONICITY, UNSPECIFIED LATERALITY: Primary | ICD-10-CM

## 2018-03-27 NOTE — TELEPHONE ENCOUNTER
Pt contacted and advised to call insurance company with covered physician and we will complete the referral process so pt can get scheduled with ortho

## 2018-04-03 ENCOUNTER — TELEPHONE (OUTPATIENT)
Dept: FAMILY MEDICINE | Facility: CLINIC | Age: 38
End: 2018-04-03

## 2018-04-03 NOTE — TELEPHONE ENCOUNTER
Unfortunately Dr. Alba is no longer accepting new medicaid patients. Patient can contact the escalation line at [phone] to find a provider that accepts his insurance.

## 2018-04-03 NOTE — TELEPHONE ENCOUNTER
----- Message from Lyn Cook sent at 4/3/2018 10:35 AM CDT -----  Contact: Ludy  Patient's wife is calling to state choice for orthopedic is Dr Alba. Please call before scheduling [phone]. Thanks!

## 2018-04-04 ENCOUNTER — PATIENT MESSAGE (OUTPATIENT)
Dept: FAMILY MEDICINE | Facility: CLINIC | Age: 38
End: 2018-04-04

## 2018-04-04 DIAGNOSIS — M25.519 SHOULDER PAIN, UNSPECIFIED CHRONICITY, UNSPECIFIED LATERALITY: Primary | ICD-10-CM

## 2018-04-04 NOTE — TELEPHONE ENCOUNTER
----- Message from Yudelka Vences sent at 4/4/2018  9:55 AM CDT -----  [phone] / Kasey returning call

## 2018-04-27 ENCOUNTER — OFFICE VISIT (OUTPATIENT)
Dept: ORTHOPEDICS | Facility: CLINIC | Age: 38
End: 2018-04-27
Payer: MEDICAID

## 2018-04-27 VITALS
HEIGHT: 67 IN | HEART RATE: 81 BPM | DIASTOLIC BLOOD PRESSURE: 93 MMHG | BODY MASS INDEX: 26.68 KG/M2 | SYSTOLIC BLOOD PRESSURE: 137 MMHG | WEIGHT: 170 LBS

## 2018-04-27 DIAGNOSIS — S43.432A SLAP LESION, TYPE II, LEFT, INITIAL ENCOUNTER: Primary | ICD-10-CM

## 2018-04-27 PROCEDURE — 99213 OFFICE O/P EST LOW 20 MIN: CPT | Mod: ,,, | Performed by: ORTHOPAEDIC SURGERY

## 2018-04-27 NOTE — PROGRESS NOTES
[facility]    Subjective:     Chief Complaint:   Chief Complaint   Patient presents with    Shoulder Pain     Follow up with leftshoulder MRI  (4/20/18)        Past Medical History:   Diagnosis Date    Bilateral chronic knee pain     Elevated LFTs     Hyperlipidemia     Hypertension     PVC's (premature ventricular contractions)     noted on Holter 3/15       Past Surgical History:   Procedure Laterality Date    CIRCUMCISION         Current Outpatient Prescriptions   Medication Sig    ascorbic acid (VITAMIN C) 500 MG tablet Take 500 mg by mouth once daily.    cyanocobalamin (VITAMIN B-12) 100 MCG tablet Take 500 mcg by mouth once daily.    ODORLESS GARLIC ORAL Take 1,000 mg by mouth once daily.    valsartan (DIOVAN) 80 MG tablet Take 1 tablet (80 mg total) by mouth once daily.    albuterol (PROVENTIL) 2.5 mg /3 mL (0.083 %) nebulizer solution Take 3 mLs (2.5 mg total) by nebulization every 6 (six) hours as needed for Wheezing. Rescue     No current facility-administered medications for this visit.        Review of patient's allergies indicates:   Allergen Reactions    No known allergies        Family History   Problem Relation Age of Onset    Diabetes Father     Gout Father     Hypertension Father     Gout Paternal Grandfather     Hypertension Paternal Grandfather     Diabetes Paternal Grandfather     Diabetes Paternal Aunt     Hypertension Paternal Aunt     Gout Paternal Aunt     Diabetes Paternal Uncle     Hypertension Paternal Uncle     Gout Paternal Uncle     Gout Maternal Grandmother     Gout Paternal Grandmother     Hypertension Paternal Grandmother     Diabetes Paternal Grandmother     No Known Problems Mother        Social History     Social History    Marital status:      Spouse name: N/A    Number of children: N/A    Years of education: N/A     Occupational History    Not on file.     Social History Main Topics    Smoking status: Never Smoker     Smokeless tobacco: Current User    Alcohol use Yes      Comment: 6 beers a night Mon through Friday and more on the weekend    Drug use: No    Sexual activity: Not on file     Other Topics Concern    Not on file     Social History Narrative    No narrative on file       History of present illness: Left shoulder MRI follow-up. He appears to have a SLAP tear of the left shoulder from what I can see and from the radiologist report. Symptomatically he is not that sore at rest only if he does certain activities does have pain in the left shoulder. Were not sure how long this SLAP tear has been there.  Review of Systems:    Constitution: Negative for chills, fever, and sweats.  Negative for unexplained weight loss.    HENT:  Negative for headaches and blurry vision.    Cardiovascular:Negative for chest pain or irregular heart beat. Negative for hypertension.    Respiratory:  Negative for cough and shortness of breath.    Gastrointestinal: Negative for abdominal pain, heartburn, melena, nausea, and vomitting.    Genitourinary:  Negative bladder incontinence and dysuria.    Musculoskeletal:  See HPI for details.     Neurological: Negative for numbness.    Psychiatric/Behavioral: Negative for depression.  The patient is not nervous/anxious.      Endocrine: Negative for polyuria    Hematologic/Lymphatic: Negative for bleeding problem.  Does not bruise/bleed easily.    Skin: Negative for poor would healing and rash    Objective:      Physical Examination:    Vital Signs:    Vitals:    04/27/18 0934   BP: (!) 137/93   Pulse: 81       Body mass index is 26.63 kg/m².    This a well-developed, well nourished patient in no acute distress.  They are alert and oriented and cooperative to examination.        Physical exam shows he has a reasonable arc without pain. His pain occurs with full forward flexion and apprehension maneuvers cause pain.    XRAY Report / Interpretation:       Assessment/Plan:      Impression a SLAP tear  left shoulder probably chronic. Not particularly inflamed at this time. So at some point I believe easily end up with surgery on this but there is no rush. He is right handed. Injection would make him feel better temporarily but he does not really need that at this time or want that at this time. We will see him back for a user an injection for flareup or for a repair of the SLAP lesion of his left shoulder.      This note was created using Dragon voice recognition software that occasionally misinterpreted phrases or words.

## 2018-04-27 NOTE — LETTER
April 27, 2018      [place]  [address]  [facility] [address]           [facility]  [address]  [facility] [address]  Phone: [phone]  Fax: [phone]          Patient: Cruz Hutchinson   MR Number: [id number]   YOB: 1980   Date of Visit: 4/27/2018       Dear Candis Sagastume:    Thank you for referring Cruz Hutchinson to me for evaluation. Attached you will find relevant portions of my assessment and plan of care.    If you have questions, please do not hesitate to call me. I look forward to following Cruz Hutchinson along with you.    Sincerely,    Jesus Delacruz Jr., MD    Enclosure  CC:  No Recipients    If you would like to receive this communication electronically, please contact [email or web address] or [phone] to request more information on [facility] Link access.    For providers and/or their staff who would like to refer a patient to [facility], please contact us through our one-stop-shop provider referral line, [facility] [place], at [phone].    If you feel you have received this communication in error or would no longer like to receive these types of communications, please e-mail [email or web address]

## 2018-07-16 ENCOUNTER — OFFICE VISIT (OUTPATIENT)
Dept: FAMILY MEDICINE | Facility: CLINIC | Age: 38
End: 2018-07-16
Payer: MEDICAID

## 2018-07-16 VITALS
BODY MASS INDEX: 26.61 KG/M2 | DIASTOLIC BLOOD PRESSURE: 70 MMHG | WEIGHT: 169.56 LBS | SYSTOLIC BLOOD PRESSURE: 126 MMHG | HEIGHT: 67 IN | RESPIRATION RATE: 18 BRPM | TEMPERATURE: 99 F | HEART RATE: 88 BPM

## 2018-07-16 DIAGNOSIS — I10 HYPERTENSION, UNSPECIFIED TYPE: Primary | ICD-10-CM

## 2018-07-16 PROCEDURE — 99213 OFFICE O/P EST LOW 20 MIN: CPT | Mod: S$GLB,,, | Performed by: FAMILY MEDICINE

## 2018-07-17 ENCOUNTER — PATIENT MESSAGE (OUTPATIENT)
Dept: FAMILY MEDICINE | Facility: CLINIC | Age: 38
End: 2018-07-17

## 2018-08-13 ENCOUNTER — PATIENT MESSAGE (OUTPATIENT)
Dept: FAMILY MEDICINE | Facility: CLINIC | Age: 38
End: 2018-08-13

## 2018-08-13 ENCOUNTER — TELEPHONE (OUTPATIENT)
Dept: FAMILY MEDICINE | Facility: CLINIC | Age: 38
End: 2018-08-13

## 2018-08-13 RX ORDER — LOSARTAN POTASSIUM 25 MG/1
25 TABLET ORAL DAILY
Qty: 30 TABLET | Refills: 1 | Status: SHIPPED | OUTPATIENT
Start: 2018-08-13 | End: 2018-09-12 | Stop reason: SDUPTHER

## 2018-08-13 NOTE — TELEPHONE ENCOUNTER
----- Message from Daniella Greco sent at 8/13/2018  3:01 PM CDT -----  Contact: self [phone]  He is completely out of his valsartan.  No pharmacy has the non recall medication.  Please let him know what you will prescribe.  He uses:   [facility] - [place] - [address]  [address]  [place] [address]  Phone: [phone] Fax: [phone]     Thank you!

## 2018-08-14 ENCOUNTER — PATIENT MESSAGE (OUTPATIENT)
Dept: FAMILY MEDICINE | Facility: CLINIC | Age: 38
End: 2018-08-14

## 2018-08-14 NOTE — TELEPHONE ENCOUNTER
This is the pt who we [facility] other [facility]. He was prescribed losartan yesterday by Dr. Mcgarry to replace the valsartan. Rx was sent to [facility]. Informed [facility] by phone that the pt is no longer taking valsartan and the replacement drug was sent to a different pharmacy to be filled per pt's request.

## 2018-08-29 ENCOUNTER — CLINICAL SUPPORT (OUTPATIENT)
Dept: FAMILY MEDICINE | Facility: CLINIC | Age: 38
End: 2018-08-29
Payer: MEDICAID

## 2018-08-29 ENCOUNTER — TELEPHONE (OUTPATIENT)
Dept: FAMILY MEDICINE | Facility: CLINIC | Age: 38
End: 2018-08-29

## 2018-08-29 DIAGNOSIS — I10 HYPERTENSION, UNSPECIFIED TYPE: Primary | ICD-10-CM

## 2018-08-29 NOTE — TELEPHONE ENCOUNTER
----- Message from Dulce Zepeda LPN sent at 8/29/2018 10:26 AM CDT -----  Pt brought his BP machine in and it was the same as the manual reading. See BP list that her brought in. 140/98

## 2018-08-29 NOTE — Clinical Note
Pt brought his BP machine in and it was the same as the manual reading. See BP list that her brought in. 140/98

## 2018-08-29 NOTE — PROGRESS NOTES
Cruz Hutchinson 38 y.o. male is here today for Blood Pressure check.   History of HTN yes.    Review of patient's allergies indicates:   Allergen Reactions    No known allergies      Creatinine   Date Value Ref Range Status   03/08/2018 1.1 0.5 - 1.4 mg/dL Final     Sodium   Date Value Ref Range Status   03/08/2018 142 136 - 145 mmol/L Final     Potassium   Date Value Ref Range Status   03/08/2018 4.4 3.5 - 5.1 mmol/L Final   ]  Patient denies taking blood pressure medications on a regular basis at the same time of the day.     Current Outpatient Medications:     ascorbic acid (VITAMIN C) 500 MG tablet, Take 500 mg by mouth once daily., Disp: , Rfl:     cyanocobalamin (VITAMIN B-12) 100 MCG tablet, Take 500 mcg by mouth once daily., Disp: , Rfl:     losartan (COZAAR) 25 MG tablet, Take 1 tablet (25 mg total) by mouth once daily., Disp: 30 tablet, Rfl: 1    ODORLESS GARLIC ORAL, Take 1,000 mg by mouth once daily., Disp: , Rfl:   Does patient have record of home blood pressure readings yes. Readings have been averaging see sheet.   Last dose of blood pressure medication was taken at 0700.  Patient is asymptomatic.   Complains of nothing.      ,   .    Blood pressure reading 140/98.  Dr. Massey notified.

## 2018-08-29 NOTE — TELEPHONE ENCOUNTER
Pls notify pt:    I see that your reading here was high but all of your home readings look great!  Since your machine is accurate and your readings look good (22/23 readings less than 135/85 with 1 reading high at 142/86), let's continue your current meds as is  If your home readings are consistently higher than 135/85, let me know

## 2018-09-12 RX ORDER — LOSARTAN POTASSIUM 25 MG/1
TABLET ORAL
Qty: 90 TABLET | Refills: 1 | Status: SHIPPED | OUTPATIENT
Start: 2018-09-12 | End: 2019-01-21 | Stop reason: SDUPTHER

## 2019-01-21 DIAGNOSIS — I10 HYPERTENSION, UNSPECIFIED TYPE: Primary | ICD-10-CM

## 2019-01-22 RX ORDER — LOSARTAN POTASSIUM 25 MG/1
25 TABLET ORAL DAILY
Qty: 90 TABLET | Refills: 0 | Status: SHIPPED | OUTPATIENT
Start: 2019-01-22 | End: 2019-03-04

## 2019-01-22 NOTE — PROGRESS NOTES
Refill Authorization Note     is requesting a refill authorization.    Brief assessment and rationale for refill: APPROVE: prr  Amount/Quantity of medication ordered: 90d        Refills Authorized: Yes  If authorized number of refills: 0           Medication Therapy Plan: HTN- lco,(lov); At last BP check pt was advised to call md if bp >135/85; approve 3 more months   Name and strength of medication: losartan (COZAAR) 25 MG tablet  How patient will take medication: t1t po qd  Medication reconciliation completed: No        Comments:   Blood Pressure Readings:  BP Readings from Last 3 Encounters:   07/16/18 126/70   04/27/18 (!) 137/93   03/16/18 (!) 140/90        Last Kidney (6 months: Diuretics or 12 months: non-diuretics)  Lab Results   Component Value Date    CREATININE 1.1 03/08/2018    K 4.4 03/08/2018     03/08/2018      Lab Results   Component Value Date     03/08/2018    BUN 15 03/08/2018    CO2 27 03/08/2018          Appointment in past 12 months or upcoming 90 days  Last visit with PCP:  Isaura Mcgarry MD:07/16/2018     Next visit with PCP:   Isaura Mcgarry MD:Visit date not found   Hyperbilirubinemia

## 2019-02-26 ENCOUNTER — TELEPHONE (OUTPATIENT)
Dept: FAMILY MEDICINE | Facility: CLINIC | Age: 39
End: 2019-02-26

## 2019-02-26 NOTE — TELEPHONE ENCOUNTER
----- Message from [facility] System Message sent at 2/25/2019  3:23 PM CST -----      Appointment Request From: Cruz Hutchinson      With Provider: Isaura Mcgarry MD [[facility]]      Preferred Date Range: 3/1/2019 - 3/29/2019      Preferred Times: Any time      Reason for visit: Existing Patient      Comments:   Annual checkup.     Please advise regarding scheduling an annual exam for this pt. All upcoming extended slots are 1 Day Change.

## 2019-03-04 ENCOUNTER — OFFICE VISIT (OUTPATIENT)
Dept: FAMILY MEDICINE | Facility: CLINIC | Age: 39
End: 2019-03-04
Payer: COMMERCIAL

## 2019-03-04 VITALS
HEART RATE: 84 BPM | WEIGHT: 169.63 LBS | OXYGEN SATURATION: 99 % | TEMPERATURE: 99 F | HEIGHT: 67 IN | RESPIRATION RATE: 18 BRPM | BODY MASS INDEX: 26.62 KG/M2 | SYSTOLIC BLOOD PRESSURE: 122 MMHG | DIASTOLIC BLOOD PRESSURE: 74 MMHG

## 2019-03-04 DIAGNOSIS — Z00.00 ANNUAL PHYSICAL EXAM: Primary | ICD-10-CM

## 2019-03-04 DIAGNOSIS — I10 HYPERTENSION, ESSENTIAL: ICD-10-CM

## 2019-03-04 DIAGNOSIS — E79.0 HYPERURICEMIA: ICD-10-CM

## 2019-03-04 LAB
ALBUMIN SERPL BCP-MCNC: 4.6 G/DL
ALP SERPL-CCNC: 61 U/L
ALT SERPL W/O P-5'-P-CCNC: 46 U/L
ANION GAP SERPL CALC-SCNC: 10 MMOL/L
AST SERPL-CCNC: 38 U/L
BASOPHILS # BLD AUTO: 0.06 K/UL
BASOPHILS NFR BLD: 1.1 %
BILIRUB SERPL-MCNC: 0.9 MG/DL
BUN SERPL-MCNC: 14 MG/DL
CALCIUM SERPL-MCNC: 10.3 MG/DL
CHLORIDE SERPL-SCNC: 102 MMOL/L
CHOLEST SERPL-MCNC: 214 MG/DL
CHOLEST/HDLC SERPL: 2.6 {RATIO}
CO2 SERPL-SCNC: 28 MMOL/L
CREAT SERPL-MCNC: 1 MG/DL
DIFFERENTIAL METHOD: ABNORMAL
EOSINOPHIL # BLD AUTO: 0.1 K/UL
EOSINOPHIL NFR BLD: 0.9 %
ERYTHROCYTE [DISTWIDTH] IN BLOOD BY AUTOMATED COUNT: 11.6 %
EST. GFR  (AFRICAN AMERICAN): >60 ML/MIN/1.73 M^2
EST. GFR  (NON AFRICAN AMERICAN): >60 ML/MIN/1.73 M^2
ESTIMATED AVG GLUCOSE: 97 MG/DL
GLUCOSE SERPL-MCNC: 93 MG/DL
HBA1C MFR BLD HPLC: 5 %
HCT VFR BLD AUTO: 43.5 %
HDLC SERPL-MCNC: 83 MG/DL
HDLC SERPL: 38.8 %
HGB BLD-MCNC: 14.8 G/DL
IMM GRANULOCYTES # BLD AUTO: 0.01 K/UL
IMM GRANULOCYTES NFR BLD AUTO: 0.2 %
LDLC SERPL CALC-MCNC: 114.4 MG/DL
LYMPHOCYTES # BLD AUTO: 1.5 K/UL
LYMPHOCYTES NFR BLD: 28.3 %
MCH RBC QN AUTO: 32.7 PG
MCHC RBC AUTO-ENTMCNC: 34 G/DL
MCV RBC AUTO: 96 FL
MONOCYTES # BLD AUTO: 0.6 K/UL
MONOCYTES NFR BLD: 11.1 %
NEUTROPHILS # BLD AUTO: 3.1 K/UL
NEUTROPHILS NFR BLD: 58.4 %
NONHDLC SERPL-MCNC: 131 MG/DL
NRBC BLD-RTO: 0 /100 WBC
PLATELET # BLD AUTO: 241 K/UL
PMV BLD AUTO: 11.4 FL
POTASSIUM SERPL-SCNC: 4 MMOL/L
PROT SERPL-MCNC: 7.8 G/DL
RBC # BLD AUTO: 4.52 M/UL
SODIUM SERPL-SCNC: 140 MMOL/L
TRIGL SERPL-MCNC: 83 MG/DL
URATE SERPL-MCNC: 7.1 MG/DL
WBC # BLD AUTO: 5.3 K/UL

## 2019-03-04 PROCEDURE — 83036 HEMOGLOBIN GLYCOSYLATED A1C: CPT

## 2019-03-04 PROCEDURE — 3074F SYST BP LT 130 MM HG: CPT | Mod: CPTII,S$GLB,, | Performed by: FAMILY MEDICINE

## 2019-03-04 PROCEDURE — 3078F PR MOST RECENT DIASTOLIC BLOOD PRESSURE < 80 MM HG: ICD-10-PCS | Mod: CPTII,S$GLB,, | Performed by: FAMILY MEDICINE

## 2019-03-04 PROCEDURE — 80061 LIPID PANEL: CPT

## 2019-03-04 PROCEDURE — 85025 COMPLETE CBC W/AUTO DIFF WBC: CPT

## 2019-03-04 PROCEDURE — 99395 PR PREVENTIVE VISIT,EST,18-39: ICD-10-PCS | Mod: S$GLB,,, | Performed by: FAMILY MEDICINE

## 2019-03-04 PROCEDURE — 80053 COMPREHEN METABOLIC PANEL: CPT

## 2019-03-04 PROCEDURE — 3078F DIAST BP <80 MM HG: CPT | Mod: CPTII,S$GLB,, | Performed by: FAMILY MEDICINE

## 2019-03-04 PROCEDURE — 99395 PREV VISIT EST AGE 18-39: CPT | Mod: S$GLB,,, | Performed by: FAMILY MEDICINE

## 2019-03-04 PROCEDURE — 84550 ASSAY OF BLOOD/URIC ACID: CPT

## 2019-03-04 PROCEDURE — 3074F PR MOST RECENT SYSTOLIC BLOOD PRESSURE < 130 MM HG: ICD-10-PCS | Mod: CPTII,S$GLB,, | Performed by: FAMILY MEDICINE

## 2019-03-04 RX ORDER — LOSARTAN POTASSIUM 50 MG/1
50 TABLET ORAL DAILY
Qty: 90 TABLET | Refills: 0 | Status: SHIPPED | OUTPATIENT
Start: 2019-03-04 | End: 2019-06-26 | Stop reason: SDUPTHER

## 2019-03-04 NOTE — PROGRESS NOTES
Subjective:       Patient ID: Cruz Hutchinson is a 38 y.o. male.    Chief Complaint: Preventative Health Care (Physical: Declined Flu Shot)    HPI   The patient is a 38-year-old who is here today for his annual exam.  Overall, he is doing well.  He is a very happy person and enjoys living life.  He enjoys doing all of his activities and does not want to make any changes.  He  eats what he wants to eat including meats and seafood.  He enjoys drinking and drinks a 6 pack of beer every night and more on the weekends.  He does stay physically active with his job but does not do any dedicated aerobic exercise.  He is due for fasting labs today.    Today we discussed the followin) hypertension.  He is currently taking Cozaar 25 mg once a day.  He is surprised that his blood pressure is good today.  At home, his blood pressures are usually in the 130s over 80s to 90s.  He rarely has home blood pressure readings in the 120s.  We have previously checked the accuracy of his blood pressure machine and it was spot on.  2)  hyperuricemia.  We did review his previous uric acid values which have been as high as 9.1.  He denies any gouty episodes.  We did discuss ways to decrease his uric acid level by decreasing his alcohol, red meat and seafood consumption but he is not interested in making those changes right now     Review of Systems   Constitutional: Negative for appetite change, chills, diaphoresis, fatigue, fever and unexpected weight change.   HENT: Negative for congestion, dental problem, ear pain, postnasal drip, rhinorrhea, sinus pressure, sneezing, sore throat and trouble swallowing.    Eyes: Negative for photophobia, pain, discharge and visual disturbance.   Respiratory: Negative for cough, chest tightness, shortness of breath and wheezing.    Cardiovascular: Negative for chest pain, palpitations and leg swelling.   Gastrointestinal: Negative for abdominal distention, abdominal pain, blood in stool,  constipation, diarrhea, nausea and vomiting.   Endocrine: Negative for polydipsia and polyuria.   Genitourinary: Negative for discharge, dysuria, flank pain, frequency, genital sores, hematuria and testicular pain.   Musculoskeletal: Negative for arthralgias, joint swelling and myalgias.   Skin: Negative for rash.   Neurological: Negative for dizziness, syncope, weakness, light-headedness and headaches.   Hematological: Negative for adenopathy. Does not bruise/bleed easily.   Psychiatric/Behavioral: Negative for dysphoric mood, self-injury, sleep disturbance and suicidal ideas. The patient is not nervous/anxious.        Objective:      Physical Exam   Constitutional: He is oriented to person, place, and time. He appears well-developed and well-nourished. No distress.   HENT:   Head: Normocephalic and atraumatic.   Right Ear: Hearing, tympanic membrane, external ear and ear canal normal.   Left Ear: Hearing, tympanic membrane, external ear and ear canal normal.   Nose: Nose normal.   Mouth/Throat: Oropharynx is clear and moist and mucous membranes are normal. No oral lesions. No oropharyngeal exudate, posterior oropharyngeal edema or posterior oropharyngeal erythema.   Eyes: Conjunctivae, EOM and lids are normal. Pupils are equal, round, and reactive to light. No scleral icterus.   Neck: Normal range of motion. Neck supple. Carotid bruit is not present. No thyroid mass and no thyromegaly present.   Cardiovascular: Normal rate, regular rhythm and normal heart sounds.  No extrasystoles are present. PMI is not displaced. Exam reveals no gallop.   No murmur heard.  Pulmonary/Chest: Effort normal and breath sounds normal. No accessory muscle usage. No respiratory distress.   Clear to auscultation bilaterally.   Abdominal: Soft. Normal appearance and bowel sounds are normal. He exhibits no abdominal bruit. There is no hepatosplenomegaly. There is no tenderness. There is no rebound.   Lymphadenopathy:        Head (right  "side): No submental and no submandibular adenopathy present.        Head (left side): No submental and no submandibular adenopathy present.        Right cervical: No superficial cervical, no deep cervical and no posterior cervical adenopathy present.       Left cervical: No superficial cervical, no deep cervical and no posterior cervical adenopathy present.        Right: No supraclavicular adenopathy present.        Left: No supraclavicular adenopathy present.   Neurological: He is alert and oriented to person, place, and time. He has normal strength. No cranial nerve deficit or sensory deficit.   Skin: Skin is warm, dry and intact.   Psychiatric: He has a normal mood and affect. His speech is normal and behavior is normal. Thought content normal. Cognition and memory are normal.     Blood pressure 122/74, pulse 84, temperature 98.6 °F (37 °C), temperature source Oral, resp. rate 18, height 5' 7" (1.702 m), weight 76.9 kg (169 lb 9.6 oz), SpO2 99 %.Body mass index is 26.56 kg/m².            A/P:  1)  annual exam.  Health maintenance issues and anticipatory guidance issues were discussed.  Immunizations are up-to-date.  We will check fasting labs.  He is not interested in modifying his diet, alcohol intake or exercise regimen currently  2) hypertension.  Uncontrolled on home readings.  We will increase Cozaar to 50 mg once a day.  He will update with home blood pressure readings in 2 weeks    3) hyperuricemia.  Asymptomatic.  Status unknown.  We will check a uric acid level.  He is not interested in allopurinol treatment at this time  "

## 2019-03-06 ENCOUNTER — PATIENT MESSAGE (OUTPATIENT)
Dept: FAMILY MEDICINE | Facility: CLINIC | Age: 39
End: 2019-03-06

## 2019-03-06 DIAGNOSIS — E78.5 HYPERLIPIDEMIA, UNSPECIFIED HYPERLIPIDEMIA TYPE: ICD-10-CM

## 2019-03-06 DIAGNOSIS — E79.0 HYPERURICEMIA: Primary | ICD-10-CM

## 2019-06-26 DIAGNOSIS — I10 HYPERTENSION, ESSENTIAL: Primary | ICD-10-CM

## 2019-06-26 NOTE — PROGRESS NOTES
Refill Authorization Note     is requesting a refill authorization.    Brief assessment and rationale for refill: DEFER: New start on this strength  Name and strength of medication: LOSARTAN POTASSIUM 50 MG TA 50 TAB       Medication Therapy Plan: LabS WNL 3/19; HTN LCO uncontrolled at home readings 3/19, elevated reading at ortho OV for shoulder pain; defer, new start on current strength    Medication reconciliation completed: No              How patient will take medication: t1t qd          Comments:   Blood Pressure Readings: <139/89mmHg (12 months)  BP Readings from Last 3 Encounters:   03/04/19 122/74   07/16/18 126/70   04/27/18 (!) 137/93        Last Creatinine/ Potassium/ Sodium(diuretics)) (6 months: Diuretics-(Cr/K/Na)  or 12 months: non-diuretics)  Lab Results   Component Value Date    CREATININE 1.0 03/04/2019    CREATININE 1.1 03/08/2018    CREATININE 1.0 03/17/2017        Lab Results   Component Value Date    K 4.0 03/04/2019    K 4.4 03/08/2018    K 4.0 03/17/2017    Lab Results   Component Value Date     03/04/2019     03/08/2018     03/17/2017        Digital Hypertension Data (if enrolled)     Last 5 Patient Entered Readings                                      Current 30 Day Average:      There is no flowsheet data to display.          APPOINTMENTS (past 12m or future 3m authorizing provider)  LAST VISIT DATE  Isaura Mcgarry MD 3/4/2019         NEXT VISIT DATE  Isaura Mcgarry MD Visit date not found

## 2019-06-27 RX ORDER — LOSARTAN POTASSIUM 50 MG/1
TABLET ORAL
Qty: 90 TABLET | Refills: 0 | Status: SHIPPED | OUTPATIENT
Start: 2019-06-27 | End: 2020-04-30

## 2019-06-27 NOTE — TELEPHONE ENCOUNTER
I have reviewed and agree with the assessment below.  Dose recently increased; pt has not returned for BP check; defer to you; will schedule pt for BP after your decision.  Thanks

## 2019-06-27 NOTE — TELEPHONE ENCOUNTER
----- Message from Gustavo Chen sent at 6/27/2019  4:31 PM CDT -----  Contact: Sugar - [facility]  Type:  Pharmacy Calling to Clarify an RX    Name of Caller:  Sugar  Pharmacy Name:    [facility] - [place] - [address]  [address]  [place] [address]  Phone: [phone] Fax: [phone]  Prescription Name:  losartan (COZAAR) 50 MG tablet  What do they need to clarify?:  authorization  Best Call Back Number:  [phone]  Additional Information:  NA

## 2019-09-06 ENCOUNTER — LAB VISIT (OUTPATIENT)
Dept: LAB | Facility: HOSPITAL | Age: 39
End: 2019-09-06
Attending: FAMILY MEDICINE
Payer: COMMERCIAL

## 2019-09-06 DIAGNOSIS — E79.0 HYPERURICEMIA: ICD-10-CM

## 2019-09-06 DIAGNOSIS — E78.5 HYPERLIPIDEMIA, UNSPECIFIED HYPERLIPIDEMIA TYPE: ICD-10-CM

## 2019-09-06 LAB
ALBUMIN SERPL BCP-MCNC: 4.6 G/DL (ref 3.5–5.2)
ALP SERPL-CCNC: 56 U/L (ref 55–135)
ALT SERPL W/O P-5'-P-CCNC: 50 U/L (ref 10–44)
ANION GAP SERPL CALC-SCNC: 9 MMOL/L (ref 8–16)
AST SERPL-CCNC: 42 U/L (ref 10–40)
BILIRUB SERPL-MCNC: 0.7 MG/DL (ref 0.1–1)
BUN SERPL-MCNC: 14 MG/DL (ref 6–20)
CALCIUM SERPL-MCNC: 10.1 MG/DL (ref 8.7–10.5)
CHLORIDE SERPL-SCNC: 106 MMOL/L (ref 95–110)
CHOLEST SERPL-MCNC: 209 MG/DL (ref 120–199)
CHOLEST/HDLC SERPL: 2.6 {RATIO} (ref 2–5)
CO2 SERPL-SCNC: 27 MMOL/L (ref 23–29)
CREAT SERPL-MCNC: 0.9 MG/DL (ref 0.5–1.4)
EST. GFR  (AFRICAN AMERICAN): >60 ML/MIN/1.73 M^2
EST. GFR  (NON AFRICAN AMERICAN): >60 ML/MIN/1.73 M^2
GLUCOSE SERPL-MCNC: 77 MG/DL (ref 70–110)
HDLC SERPL-MCNC: 79 MG/DL (ref 40–75)
HDLC SERPL: 37.8 % (ref 20–50)
LDLC SERPL CALC-MCNC: 97.6 MG/DL (ref 63–159)
NONHDLC SERPL-MCNC: 130 MG/DL
POTASSIUM SERPL-SCNC: 4.3 MMOL/L (ref 3.5–5.1)
PROT SERPL-MCNC: 8.1 G/DL (ref 6–8.4)
SODIUM SERPL-SCNC: 142 MMOL/L (ref 136–145)
TRIGL SERPL-MCNC: 162 MG/DL (ref 30–150)
URATE SERPL-MCNC: 8.6 MG/DL (ref 3.4–7)

## 2019-09-06 PROCEDURE — 84550 ASSAY OF BLOOD/URIC ACID: CPT

## 2019-09-06 PROCEDURE — 80053 COMPREHEN METABOLIC PANEL: CPT

## 2019-09-06 PROCEDURE — 80061 LIPID PANEL: CPT

## 2019-09-06 PROCEDURE — 36415 COLL VENOUS BLD VENIPUNCTURE: CPT | Mod: PO

## 2019-09-08 ENCOUNTER — PATIENT MESSAGE (OUTPATIENT)
Dept: FAMILY MEDICINE | Facility: CLINIC | Age: 39
End: 2019-09-08

## 2019-09-08 DIAGNOSIS — R79.89 ELEVATED LFTS: Primary | ICD-10-CM

## 2020-04-29 ENCOUNTER — PATIENT MESSAGE (OUTPATIENT)
Dept: FAMILY MEDICINE | Facility: CLINIC | Age: 40
End: 2020-04-29

## 2020-04-29 DIAGNOSIS — I10 HYPERTENSION, ESSENTIAL: ICD-10-CM

## 2020-04-30 RX ORDER — LOSARTAN POTASSIUM 50 MG/1
TABLET ORAL
Qty: 90 TABLET | Refills: 0 | Status: SHIPPED | OUTPATIENT
Start: 2020-04-30 | End: 2020-07-03 | Stop reason: SDUPTHER

## 2020-05-05 ENCOUNTER — PATIENT MESSAGE (OUTPATIENT)
Dept: ADMINISTRATIVE | Facility: HOSPITAL | Age: 40
End: 2020-05-05

## 2020-05-13 ENCOUNTER — PATIENT OUTREACH (OUTPATIENT)
Dept: ADMINISTRATIVE | Facility: HOSPITAL | Age: 40
End: 2020-05-13

## 2020-05-13 NOTE — PROGRESS NOTES
Chart review completed 05/13/2020.  Care Everywhere updates requested and reviewed.  Immunizations reconciled. Media reviewed.

## 2020-05-15 ENCOUNTER — OFFICE VISIT (OUTPATIENT)
Dept: FAMILY MEDICINE | Facility: CLINIC | Age: 40
End: 2020-05-15
Payer: COMMERCIAL

## 2020-05-15 VITALS
BODY MASS INDEX: 25.03 KG/M2 | DIASTOLIC BLOOD PRESSURE: 80 MMHG | HEIGHT: 67 IN | RESPIRATION RATE: 18 BRPM | TEMPERATURE: 98 F | WEIGHT: 159.5 LBS | SYSTOLIC BLOOD PRESSURE: 124 MMHG | OXYGEN SATURATION: 97 % | HEART RATE: 82 BPM

## 2020-05-15 DIAGNOSIS — Z11.4 SCREENING FOR HIV WITHOUT PRESENCE OF RISK FACTORS: ICD-10-CM

## 2020-05-15 DIAGNOSIS — M25.50 ARTHRALGIA, UNSPECIFIED JOINT: ICD-10-CM

## 2020-05-15 DIAGNOSIS — R79.89 ELEVATED LFTS: ICD-10-CM

## 2020-05-15 DIAGNOSIS — Z00.00 ANNUAL PHYSICAL EXAM: Primary | ICD-10-CM

## 2020-05-15 DIAGNOSIS — E79.0 HYPERURICEMIA: ICD-10-CM

## 2020-05-15 LAB
ALBUMIN SERPL BCP-MCNC: 4.4 G/DL (ref 3.5–5.2)
ALP SERPL-CCNC: 76 U/L (ref 55–135)
ALT SERPL W/O P-5'-P-CCNC: 38 U/L (ref 10–44)
ANION GAP SERPL CALC-SCNC: 11 MMOL/L (ref 8–16)
AST SERPL-CCNC: 44 U/L (ref 10–40)
BASOPHILS # BLD AUTO: 0.07 K/UL (ref 0–0.2)
BASOPHILS NFR BLD: 1.3 % (ref 0–1.9)
BILIRUB SERPL-MCNC: 0.3 MG/DL (ref 0.1–1)
BUN SERPL-MCNC: 12 MG/DL (ref 6–20)
CALCIUM SERPL-MCNC: 9.7 MG/DL (ref 8.7–10.5)
CHLORIDE SERPL-SCNC: 106 MMOL/L (ref 95–110)
CHOLEST SERPL-MCNC: 242 MG/DL (ref 120–199)
CHOLEST/HDLC SERPL: 3.2 {RATIO} (ref 2–5)
CO2 SERPL-SCNC: 25 MMOL/L (ref 23–29)
CREAT SERPL-MCNC: 1 MG/DL (ref 0.5–1.4)
DIFFERENTIAL METHOD: ABNORMAL
EOSINOPHIL # BLD AUTO: 0.1 K/UL (ref 0–0.5)
EOSINOPHIL NFR BLD: 2.3 % (ref 0–8)
ERYTHROCYTE [DISTWIDTH] IN BLOOD BY AUTOMATED COUNT: 11.6 % (ref 11.5–14.5)
EST. GFR  (AFRICAN AMERICAN): >60 ML/MIN/1.73 M^2
EST. GFR  (NON AFRICAN AMERICAN): >60 ML/MIN/1.73 M^2
ESTIMATED AVG GLUCOSE: 94 MG/DL (ref 68–131)
GLUCOSE SERPL-MCNC: 92 MG/DL (ref 70–110)
HBA1C MFR BLD HPLC: 4.9 % (ref 4–5.6)
HCT VFR BLD AUTO: 44.4 % (ref 40–54)
HDLC SERPL-MCNC: 76 MG/DL (ref 40–75)
HDLC SERPL: 31.4 % (ref 20–50)
HGB BLD-MCNC: 15.1 G/DL (ref 14–18)
IMM GRANULOCYTES # BLD AUTO: 0.01 K/UL (ref 0–0.04)
IMM GRANULOCYTES NFR BLD AUTO: 0.2 % (ref 0–0.5)
LDLC SERPL CALC-MCNC: 119.2 MG/DL (ref 63–159)
LYMPHOCYTES # BLD AUTO: 2.5 K/UL (ref 1–4.8)
LYMPHOCYTES NFR BLD: 47.1 % (ref 18–48)
MCH RBC QN AUTO: 33.2 PG (ref 27–31)
MCHC RBC AUTO-ENTMCNC: 34 G/DL (ref 32–36)
MCV RBC AUTO: 98 FL (ref 82–98)
MONOCYTES # BLD AUTO: 0.5 K/UL (ref 0.3–1)
MONOCYTES NFR BLD: 9.5 % (ref 4–15)
NEUTROPHILS # BLD AUTO: 2.1 K/UL (ref 1.8–7.7)
NEUTROPHILS NFR BLD: 39.6 % (ref 38–73)
NONHDLC SERPL-MCNC: 166 MG/DL
NRBC BLD-RTO: 0 /100 WBC
PLATELET # BLD AUTO: 247 K/UL (ref 150–350)
PMV BLD AUTO: 10.9 FL (ref 9.2–12.9)
POTASSIUM SERPL-SCNC: 4 MMOL/L (ref 3.5–5.1)
PROT SERPL-MCNC: 7.9 G/DL (ref 6–8.4)
RBC # BLD AUTO: 4.55 M/UL (ref 4.6–6.2)
SODIUM SERPL-SCNC: 142 MMOL/L (ref 136–145)
TRIGL SERPL-MCNC: 234 MG/DL (ref 30–150)
TSH SERPL DL<=0.005 MIU/L-ACNC: 1.43 UIU/ML (ref 0.4–4)
URATE SERPL-MCNC: 9.3 MG/DL (ref 3.4–7)
WBC # BLD AUTO: 5.26 K/UL (ref 3.9–12.7)

## 2020-05-15 PROCEDURE — 80061 LIPID PANEL: CPT

## 2020-05-15 PROCEDURE — 99396 PR PREVENTIVE VISIT,EST,40-64: ICD-10-PCS | Mod: S$GLB,,, | Performed by: FAMILY MEDICINE

## 2020-05-15 PROCEDURE — 99396 PREV VISIT EST AGE 40-64: CPT | Mod: S$GLB,,, | Performed by: FAMILY MEDICINE

## 2020-05-15 PROCEDURE — 3074F PR MOST RECENT SYSTOLIC BLOOD PRESSURE < 130 MM HG: ICD-10-PCS | Mod: CPTII,S$GLB,, | Performed by: FAMILY MEDICINE

## 2020-05-15 PROCEDURE — 36415 COLL VENOUS BLD VENIPUNCTURE: CPT | Mod: S$GLB,,, | Performed by: FAMILY MEDICINE

## 2020-05-15 PROCEDURE — 85025 COMPLETE CBC W/AUTO DIFF WBC: CPT

## 2020-05-15 PROCEDURE — 84550 ASSAY OF BLOOD/URIC ACID: CPT

## 2020-05-15 PROCEDURE — 3074F SYST BP LT 130 MM HG: CPT | Mod: CPTII,S$GLB,, | Performed by: FAMILY MEDICINE

## 2020-05-15 PROCEDURE — 84443 ASSAY THYROID STIM HORMONE: CPT

## 2020-05-15 PROCEDURE — 3079F PR MOST RECENT DIASTOLIC BLOOD PRESSURE 80-89 MM HG: ICD-10-PCS | Mod: CPTII,S$GLB,, | Performed by: FAMILY MEDICINE

## 2020-05-15 PROCEDURE — 3079F DIAST BP 80-89 MM HG: CPT | Mod: CPTII,S$GLB,, | Performed by: FAMILY MEDICINE

## 2020-05-15 PROCEDURE — 83036 HEMOGLOBIN GLYCOSYLATED A1C: CPT

## 2020-05-15 PROCEDURE — 86703 HIV-1/HIV-2 1 RESULT ANTBDY: CPT

## 2020-05-15 PROCEDURE — 36415 PR COLLECTION VENOUS BLOOD,VENIPUNCTURE: ICD-10-PCS | Mod: S$GLB,,, | Performed by: FAMILY MEDICINE

## 2020-05-15 PROCEDURE — 80053 COMPREHEN METABOLIC PANEL: CPT

## 2020-05-17 NOTE — PROGRESS NOTES
Subjective:       Patient ID: Cruz Hutchinson is a 40 y.o. male.    Chief Complaint: Annual Exam    HPI   The patient is a 40-year-old who is here today for his annual exam.  Since our last visit, he has been doing well.  He has cut back on his drinking.  He decided he wanted to decrease his alcohol intake and see if that helped his liver blood tests.  He wanted to make this change to see if his liver levels improved before checking liver ultrasound as previously recommended.  He is now drinking 1-2 beers a day and a shot of rum instead of his usual 6 or 7 beers a day give or take.  He does stay physically active.  His immunizations are up-to-date.  He is fasting for labs today.    Regarding his hypertension, he is back on his Cozaar.  He had gone off of his Cozaar and was managing his blood pressure with essential oils.  Initially, his blood pressure did well with the essential oils but then things started getting busy and stressful at work and he could feel his blood pressure increasing.  He recently resumed the Cozaar and has been tolerating that well    We did discuss his history of hyperuricemia.  He has no personal history of gout but does have a strong family history of gout.  He is hesitant to consider medication for his uric acid level    Review of Systems   Constitutional: Negative for appetite change, chills, diaphoresis, fatigue, fever and unexpected weight change.   HENT: Negative for congestion, dental problem, ear pain, postnasal drip, rhinorrhea, sinus pressure, sneezing, sore throat and trouble swallowing.    Eyes: Negative for photophobia, pain, discharge and visual disturbance.   Respiratory: Negative for cough, chest tightness, shortness of breath and wheezing.    Cardiovascular: Negative for chest pain, palpitations and leg swelling.   Gastrointestinal: Negative for abdominal distention, abdominal pain, blood in stool, constipation, diarrhea, nausea and vomiting.   Endocrine: Negative for  polydipsia and polyuria.   Genitourinary: Negative for discharge, dysuria, flank pain, frequency, genital sores, hematuria and testicular pain.   Musculoskeletal: Negative for arthralgias, joint swelling and myalgias.   Skin: Negative for rash.   Neurological: Negative for dizziness, syncope, weakness, light-headedness and headaches.   Hematological: Negative for adenopathy. Does not bruise/bleed easily.   Psychiatric/Behavioral: Negative for dysphoric mood, self-injury, sleep disturbance and suicidal ideas. The patient is not nervous/anxious.        Objective:      Physical Exam   Constitutional: He is oriented to person, place, and time. He appears well-developed and well-nourished. No distress.   HENT:   Head: Normocephalic and atraumatic.   Right Ear: Hearing, tympanic membrane, external ear and ear canal normal.   Left Ear: Hearing, tympanic membrane, external ear and ear canal normal.   Nose: Nose normal.   Mouth/Throat: Oropharynx is clear and moist and mucous membranes are normal. No oral lesions. No oropharyngeal exudate, posterior oropharyngeal edema or posterior oropharyngeal erythema.   Eyes: Pupils are equal, round, and reactive to light. Conjunctivae, EOM and lids are normal. No scleral icterus.   Neck: Normal range of motion. Neck supple. Carotid bruit is not present. No thyroid mass and no thyromegaly present.   Cardiovascular: Normal rate, regular rhythm and normal heart sounds.  No extrasystoles are present. PMI is not displaced. Exam reveals no gallop.   No murmur heard.  Pulmonary/Chest: Effort normal and breath sounds normal. No accessory muscle usage. No respiratory distress.   Clear to auscultation bilaterally.   Abdominal: Soft. Normal appearance and bowel sounds are normal. He exhibits no abdominal bruit. There is no hepatosplenomegaly. There is no tenderness. There is no rebound.   Lymphadenopathy:        Head (right side): No submental and no submandibular adenopathy present.        Head  "(left side): No submental and no submandibular adenopathy present.        Right cervical: No superficial cervical, no deep cervical and no posterior cervical adenopathy present.       Left cervical: No superficial cervical, no deep cervical and no posterior cervical adenopathy present.        Right: No supraclavicular adenopathy present.        Left: No supraclavicular adenopathy present.   Neurological: He is alert and oriented to person, place, and time.   Skin: Skin is warm, dry and intact.   Psychiatric: He has a normal mood and affect.       Blood pressure 124/80, pulse 82, temperature 97.9 °F (36.6 °C), temperature source Oral, resp. rate 18, height 5' 7" (1.702 m), weight 72.3 kg (159 lb 8 oz), SpO2 97 %.Body mass index is 24.98 kg/m².            A/P:  1) annual exam.  Health maintenance issues and anticipatory guidance issues were discussed.  We will check fasting labs today.  He will work on consuming a healthy diet and staying physically active.  We will check fasting labs today.  I did congratulate him on decreasing his alcohol intake and we did discuss decreasing this a bit further  2) Hypertension.  Well controlled.  Continue with Cozaar   3)  Hyperuricemia.  Asymptomatic.  We will check a uric acid level    4)  History of elevated LFTs.  Status unknown.  We will check LFTs today.  If they remain elevated, we will proceed with a liver ultrasound  "

## 2020-05-18 LAB — HIV 1+2 AB+HIV1 P24 AG SERPL QL IA: NEGATIVE

## 2020-05-20 ENCOUNTER — HOSPITAL ENCOUNTER (OUTPATIENT)
Dept: RADIOLOGY | Facility: HOSPITAL | Age: 40
Discharge: HOME | End: 2020-05-20
Attending: FAMILY MEDICINE
Payer: COMMERCIAL

## 2020-05-20 DIAGNOSIS — M25.50 ARTHRALGIA, UNSPECIFIED JOINT: ICD-10-CM

## 2020-05-20 PROCEDURE — 73140 X-RAY EXAM OF FINGER(S): CPT | Mod: 26,59,LT, | Performed by: RADIOLOGY

## 2020-05-20 PROCEDURE — 73130 X-RAY EXAM OF HAND: CPT | Mod: 26,RT,, | Performed by: RADIOLOGY

## 2020-05-20 PROCEDURE — 73140 X-RAY EXAM OF FINGER(S): CPT | Mod: TC,FY,PO,59

## 2020-05-20 PROCEDURE — 73130 X-RAY EXAM OF HAND: CPT | Mod: TC,FY,PO,RT

## 2020-05-20 PROCEDURE — 73140 XR FINGER 2 OR MORE VIEWS: ICD-10-PCS | Mod: 26,59,LT, | Performed by: RADIOLOGY

## 2020-05-20 PROCEDURE — 73130 XR HAND COMPLETE 3 VIEW RIGHT: ICD-10-PCS | Mod: 26,RT,, | Performed by: RADIOLOGY

## 2020-05-21 ENCOUNTER — PATIENT MESSAGE (OUTPATIENT)
Dept: FAMILY MEDICINE | Facility: CLINIC | Age: 40
End: 2020-05-21

## 2020-05-28 ENCOUNTER — PATIENT MESSAGE (OUTPATIENT)
Dept: FAMILY MEDICINE | Facility: CLINIC | Age: 40
End: 2020-05-28

## 2020-05-28 DIAGNOSIS — R89.9 ABNORMAL LABORATORY TEST RESULT: Primary | ICD-10-CM

## 2020-07-03 DIAGNOSIS — I10 HYPERTENSION, ESSENTIAL: ICD-10-CM

## 2020-07-06 RX ORDER — LOSARTAN POTASSIUM 50 MG/1
50 TABLET ORAL DAILY
Qty: 90 TABLET | Refills: 2 | Status: SHIPPED | OUTPATIENT
Start: 2020-07-06 | End: 2020-12-01

## 2020-08-28 ENCOUNTER — PATIENT MESSAGE (OUTPATIENT)
Dept: FAMILY MEDICINE | Facility: CLINIC | Age: 40
End: 2020-08-28

## 2020-08-29 RX ORDER — LOSARTAN POTASSIUM 50 MG/1
75 TABLET ORAL DAILY
Qty: 135 TABLET | Refills: 0 | Status: SHIPPED | OUTPATIENT
Start: 2020-08-29 | End: 2020-12-01

## 2020-11-25 ENCOUNTER — PATIENT MESSAGE (OUTPATIENT)
Dept: FAMILY MEDICINE | Facility: CLINIC | Age: 40
End: 2020-11-25

## 2020-12-01 RX ORDER — LOSARTAN POTASSIUM 100 MG/1
100 TABLET ORAL DAILY
Qty: 30 TABLET | Refills: 1 | Status: SHIPPED | OUTPATIENT
Start: 2020-12-01 | End: 2021-12-01

## 2021-05-06 ENCOUNTER — PATIENT MESSAGE (OUTPATIENT)
Dept: RESEARCH | Facility: HOSPITAL | Age: 41
End: 2021-05-06

## 2021-07-07 ENCOUNTER — PATIENT MESSAGE (OUTPATIENT)
Dept: ADMINISTRATIVE | Facility: HOSPITAL | Age: 41
End: 2021-07-07

## 2021-09-26 ENCOUNTER — PATIENT MESSAGE (OUTPATIENT)
Dept: ADMINISTRATIVE | Facility: HOSPITAL | Age: 41
End: 2021-09-26

## 2021-09-26 ENCOUNTER — TELEPHONE (OUTPATIENT)
Dept: ADMINISTRATIVE | Facility: HOSPITAL | Age: 41
End: 2021-09-26

## 2022-05-31 ENCOUNTER — PATIENT MESSAGE (OUTPATIENT)
Dept: ADMINISTRATIVE | Facility: HOSPITAL | Age: 42
End: 2022-05-31
Payer: COMMERCIAL